# Patient Record
Sex: FEMALE | Race: BLACK OR AFRICAN AMERICAN | Employment: OTHER | URBAN - METROPOLITAN AREA
[De-identification: names, ages, dates, MRNs, and addresses within clinical notes are randomized per-mention and may not be internally consistent; named-entity substitution may affect disease eponyms.]

---

## 2017-09-13 ENCOUNTER — HOSPITAL ENCOUNTER (EMERGENCY)
Age: 72
Discharge: HOME | End: 2017-09-13
Attending: EMERGENCY MEDICINE
Payer: MEDICAID

## 2017-09-13 ENCOUNTER — APPOINTMENT (OUTPATIENT)
Dept: GENERAL RADIOLOGY | Age: 72
End: 2017-09-13
Attending: EMERGENCY MEDICINE
Payer: MEDICAID

## 2017-09-13 VITALS
TEMPERATURE: 98.5 F | SYSTOLIC BLOOD PRESSURE: 143 MMHG | HEART RATE: 98 BPM | DIASTOLIC BLOOD PRESSURE: 64 MMHG | BODY MASS INDEX: 19.3 KG/M2 | RESPIRATION RATE: 23 BRPM | WEIGHT: 123 LBS | OXYGEN SATURATION: 100 % | HEIGHT: 67 IN

## 2017-09-13 DIAGNOSIS — R07.89 CHEST WALL PAIN: Primary | ICD-10-CM

## 2017-09-13 DIAGNOSIS — F41.1 ANXIETY STATE: ICD-10-CM

## 2017-09-13 LAB
ALBUMIN SERPL-MCNC: 3.5 G/DL (ref 3.4–5)
ALBUMIN/GLOB SERPL: 0.6 {RATIO} (ref 0.8–1.7)
ALP SERPL-CCNC: 68 U/L (ref 45–117)
ALT SERPL-CCNC: 32 U/L (ref 13–56)
ANION GAP SERPL CALC-SCNC: 11 MMOL/L (ref 3–18)
AST SERPL-CCNC: 38 U/L (ref 15–37)
ATRIAL RATE: 98 BPM
BASOPHILS # BLD: 0 K/UL (ref 0–0.06)
BASOPHILS NFR BLD: 0 % (ref 0–2)
BILIRUB SERPL-MCNC: 0.4 MG/DL (ref 0.2–1)
BUN SERPL-MCNC: 20 MG/DL (ref 7–18)
BUN/CREAT SERPL: 22 (ref 12–20)
CALCIUM SERPL-MCNC: 9.7 MG/DL (ref 8.5–10.1)
CALCULATED P AXIS, ECG09: 66 DEGREES
CALCULATED R AXIS, ECG10: 9 DEGREES
CALCULATED T AXIS, ECG11: 73 DEGREES
CHLORIDE SERPL-SCNC: 102 MMOL/L (ref 100–108)
CK MB CFR SERPL CALC: 1.3 % (ref 0–4)
CK MB SERPL-MCNC: 1.9 NG/ML (ref 5–25)
CK SERPL-CCNC: 141 U/L (ref 26–192)
CO2 SERPL-SCNC: 23 MMOL/L (ref 21–32)
CREAT SERPL-MCNC: 0.89 MG/DL (ref 0.6–1.3)
DIAGNOSIS, 93000: NORMAL
DIFFERENTIAL METHOD BLD: ABNORMAL
EOSINOPHIL # BLD: 0.4 K/UL (ref 0–0.4)
EOSINOPHIL NFR BLD: 7 % (ref 0–5)
ERYTHROCYTE [DISTWIDTH] IN BLOOD BY AUTOMATED COUNT: 14.2 % (ref 11.6–14.5)
GLOBULIN SER CALC-MCNC: 6.1 G/DL (ref 2–4)
GLUCOSE SERPL-MCNC: 108 MG/DL (ref 74–99)
HCT VFR BLD AUTO: 40.1 % (ref 35–45)
HGB BLD-MCNC: 13.3 G/DL (ref 12–16)
LYMPHOCYTES # BLD: 1.7 K/UL (ref 0.9–3.6)
LYMPHOCYTES NFR BLD: 33 % (ref 21–52)
MCH RBC QN AUTO: 28.4 PG (ref 24–34)
MCHC RBC AUTO-ENTMCNC: 33.2 G/DL (ref 31–37)
MCV RBC AUTO: 85.5 FL (ref 74–97)
MONOCYTES # BLD: 0.4 K/UL (ref 0.05–1.2)
MONOCYTES NFR BLD: 8 % (ref 3–10)
NEUTS SEG # BLD: 2.7 K/UL (ref 1.8–8)
NEUTS SEG NFR BLD: 52 % (ref 40–73)
P-R INTERVAL, ECG05: 162 MS
PLATELET # BLD AUTO: 224 K/UL (ref 135–420)
PMV BLD AUTO: 10.8 FL (ref 9.2–11.8)
POTASSIUM SERPL-SCNC: 3.8 MMOL/L (ref 3.5–5.5)
PROT SERPL-MCNC: 9.6 G/DL (ref 6.4–8.2)
Q-T INTERVAL, ECG07: 372 MS
QRS DURATION, ECG06: 86 MS
QTC CALCULATION (BEZET), ECG08: 474 MS
RBC # BLD AUTO: 4.69 M/UL (ref 4.2–5.3)
SODIUM SERPL-SCNC: 136 MMOL/L (ref 136–145)
TROPONIN I SERPL-MCNC: <0.02 NG/ML (ref 0–0.04)
VENTRICULAR RATE, ECG03: 98 BPM
WBC # BLD AUTO: 5.1 K/UL (ref 4.6–13.2)

## 2017-09-13 PROCEDURE — 71010 XR CHEST PORT: CPT

## 2017-09-13 PROCEDURE — 85025 COMPLETE CBC W/AUTO DIFF WBC: CPT | Performed by: EMERGENCY MEDICINE

## 2017-09-13 PROCEDURE — 74011250637 HC RX REV CODE- 250/637: Performed by: EMERGENCY MEDICINE

## 2017-09-13 PROCEDURE — 82550 ASSAY OF CK (CPK): CPT | Performed by: EMERGENCY MEDICINE

## 2017-09-13 PROCEDURE — 99284 EMERGENCY DEPT VISIT MOD MDM: CPT

## 2017-09-13 PROCEDURE — 74011250636 HC RX REV CODE- 250/636: Performed by: EMERGENCY MEDICINE

## 2017-09-13 PROCEDURE — 80053 COMPREHEN METABOLIC PANEL: CPT | Performed by: EMERGENCY MEDICINE

## 2017-09-13 PROCEDURE — 93005 ELECTROCARDIOGRAM TRACING: CPT

## 2017-09-13 PROCEDURE — 96374 THER/PROPH/DIAG INJ IV PUSH: CPT

## 2017-09-13 RX ORDER — IBUPROFEN 400 MG/1
400 TABLET ORAL
Qty: 20 TAB | Refills: 0 | Status: SHIPPED | OUTPATIENT
Start: 2017-09-13 | End: 2017-11-18

## 2017-09-13 RX ORDER — GUAIFENESIN 100 MG/5ML
162 LIQUID (ML) ORAL
Status: COMPLETED | OUTPATIENT
Start: 2017-09-13 | End: 2017-09-13

## 2017-09-13 RX ORDER — LORAZEPAM 2 MG/ML
0.5 INJECTION INTRAMUSCULAR
Status: COMPLETED | OUTPATIENT
Start: 2017-09-13 | End: 2017-09-13

## 2017-09-13 RX ORDER — ACETAMINOPHEN 500 MG
500 TABLET ORAL
Status: COMPLETED | OUTPATIENT
Start: 2017-09-13 | End: 2017-09-13

## 2017-09-13 RX ORDER — ACETAMINOPHEN 500 MG
1000 TABLET ORAL
Qty: 40 TAB | Refills: 0 | Status: SHIPPED | OUTPATIENT
Start: 2017-09-13 | End: 2018-08-03

## 2017-09-13 RX ADMIN — ASPIRIN 81 MG 162 MG: 81 TABLET ORAL at 04:53

## 2017-09-13 RX ADMIN — LORAZEPAM 0.5 MG: 2 INJECTION INTRAMUSCULAR; INTRAVENOUS at 04:56

## 2017-09-13 RX ADMIN — ACETAMINOPHEN 500 MG: 500 TABLET ORAL at 04:54

## 2017-09-13 NOTE — ED PROVIDER NOTES
HPI Comments: Damir Torres is a 71 y.o. Female with h/o htn, hiv with c/o constant right sided cp, bilateral leg pain for last 2 days with no sob, exertional pain, fcs. Some non prod cough. No nvd, abd pain, swelling. Sx worse with movement, turning. Nothing taken. States she has been compliant with her meds. No h/o cad. Seen prior for chest pain, similar with neg cta chest, serial enzymes. No stress testing done in last 3 years. Nothing taken for pain. Transported via ems and was noted to have very elevated blood pressure with no prehosp ecg, nor interventions performed    The history is provided by the patient. Past Medical History:   Diagnosis Date    AIDS ([facility] [address]     Asthma     Hypertension        History reviewed. No pertinent surgical history. History reviewed. No pertinent family history. Social History     Social History    Marital status: LEGALLY      Spouse name: N/A    Number of children: N/A    Years of education: N/A     Occupational History    Not on file. Social History Main Topics    Smoking status: Never Smoker    Smokeless tobacco: Never Used    Alcohol use No    Drug use: No    Sexual activity: Not on file     Other Topics Concern    Not on file     Social History Narrative         ALLERGIES: Pcn [penicillins]    Review of Systems   Constitutional: Negative for fever. HENT: Negative for sore throat. Eyes: Negative for visual disturbance. Respiratory: Positive for cough. Negative for shortness of breath. Cardiovascular: Positive for chest pain. Negative for leg swelling. Gastrointestinal: Negative for abdominal pain and blood in stool. Endocrine: Negative for polyuria. Genitourinary: Negative for difficulty urinating. Musculoskeletal: Positive for myalgias. Negative for gait problem. Skin: Negative for rash. Allergic/Immunologic: Negative for immunocompromised state (cd4 recently over 400). Neurological: Negative for syncope. Psychiatric/Behavioral: Positive for sleep disturbance. Vitals:    09/13/17 0438 09/13/17 0450   BP:  143/64   Pulse: 98    Resp: 23    Temp: 98.5 °F (36.9 °C)    SpO2: 100%    Weight: 55.8 kg (123 lb)    Height: 5' 7\" (1.702 m)             Physical Exam   Constitutional: She is oriented to person, place, and time. She appears well-developed and well-nourished. She appears distressed (appears very anxious). HENT:   Head: Normocephalic and atraumatic. Right Ear: External ear normal.   Left Ear: External ear normal.   Nose: Nose normal.   Mouth/Throat: Uvula is midline, oropharynx is clear and moist and mucous membranes are normal.   Eyes: Conjunctivae are normal. No scleral icterus. Neck: Neck supple. Cardiovascular: Normal rate, regular rhythm, normal heart sounds and intact distal pulses. Pulses:       Radial pulses are 2+ on the right side, and 2+ on the left side. Femoral pulses are 2+ on the right side, and 2+ on the left side. Dorsalis pedis pulses are 2+ on the right side, and 2+ on the left side. Posterior tibial pulses are 2+ on the right side, and 2+ on the left side. Pulmonary/Chest: Effort normal and breath sounds normal. She exhibits tenderness and bony tenderness. She exhibits no edema, no deformity and no swelling. Abdominal: Soft. There is no tenderness. Musculoskeletal: She exhibits no edema. Neurological: She is alert and oriented to person, place, and time. Gait normal.   Skin: Skin is warm and dry. She is not diaphoretic. Psychiatric: Her behavior is normal.   Nursing note and vitals reviewed.        [facility]  ED Course       Procedures    Vitals:  Patient Vitals for the past 12 hrs:   Temp Pulse Resp BP SpO2   09/13/17 0450 - - - 143/64 -   09/13/17 0438 98.5 °F (36.9 °C) 98 23 - 100 %         Medications ordered:   Medications   LORazepam (ATIVAN) injection 0.5 mg (0.5 mg IntraVENous Given 9/13/17 0456)   aspirin chewable tablet 162 mg (162 mg Oral Given 9/13/17 0453)   acetaminophen (TYLENOL) tablet 500 mg (500 mg Oral Given 9/13/17 0454)         Lab findings:  Recent Results (from the past 12 hour(s))   EKG, 12 LEAD, INITIAL    Collection Time: 09/13/17  4:49 AM   Result Value Ref Range    Ventricular Rate 98 BPM    Atrial Rate 98 BPM    P-R Interval 162 ms    QRS Duration 86 ms    Q-T Interval 372 ms    QTC Calculation (Bezet) 474 ms    Calculated P Axis 66 degrees    Calculated R Axis 9 degrees    Calculated T Axis 73 degrees    Diagnosis       Sinus rhythm with premature atrial complexes  Possible Left atrial enlargement  Left ventricular hypertrophy  Prolonged QT  Abnormal ECG  When compared with ECG of 03-FEB-2015 21:35,  premature atrial complexes are now present     CBC WITH AUTOMATED DIFF    Collection Time: 09/13/17  4:55 AM   Result Value Ref Range    WBC 5.1 4.6 - 13.2 K/uL    RBC 4.69 4.20 - 5.30 M/uL    HGB 13.3 12.0 - 16.0 g/dL    HCT 40.1 35.0 - 45.0 %    MCV 85.5 74.0 - 97.0 FL    MCH 28.4 24.0 - 34.0 PG    MCHC 33.2 31.0 - 37.0 g/dL    RDW 14.2 11.6 - 14.5 %    PLATELET 323 176 - 637 K/uL    MPV 10.8 9.2 - 11.8 FL    NEUTROPHILS 52 40 - 73 %    LYMPHOCYTES 33 21 - 52 %    MONOCYTES 8 3 - 10 %    EOSINOPHILS 7 (H) 0 - 5 %    BASOPHILS 0 0 - 2 %    ABS. NEUTROPHILS 2.7 1.8 - 8.0 K/UL    ABS. LYMPHOCYTES 1.7 0.9 - 3.6 K/UL    ABS. MONOCYTES 0.4 0.05 - 1.2 K/UL    ABS. EOSINOPHILS 0.4 0.0 - 0.4 K/UL    ABS.  BASOPHILS 0.0 0.0 - 0.06 K/UL    DF AUTOMATED     METABOLIC PANEL, COMPREHENSIVE    Collection Time: 09/13/17  4:55 AM   Result Value Ref Range    Sodium 136 136 - 145 mmol/L    Potassium 3.8 3.5 - 5.5 mmol/L    Chloride 102 100 - 108 mmol/L    CO2 23 21 - 32 mmol/L    Anion gap 11 3.0 - 18 mmol/L    Glucose 108 (H) 74 - 99 mg/dL    BUN 20 (H) 7.0 - 18 MG/DL    Creatinine 0.89 0.6 - 1.3 MG/DL    BUN/Creatinine ratio 22 (H) 12 - 20      GFR est AA >60 >60 ml/min/1.73m2    GFR est non-AA >60 >60 ml/min/1.73m2    Calcium 9.7 8.5 - 10.1 MG/DL Bilirubin, total 0.4 0.2 - 1.0 MG/DL    ALT (SGPT) 32 13 - 56 U/L    AST (SGOT) 38 (H) 15 - 37 U/L    Alk. phosphatase 68 45 - 117 U/L    Protein, total 9.6 (H) 6.4 - 8.2 g/dL    Albumin 3.5 3.4 - 5.0 g/dL    Globulin 6.1 (H) 2.0 - 4.0 g/dL    A-G Ratio 0.6 (L) 0.8 - 1.7     CARDIAC PANEL,(CK, CKMB & TROPONIN)    Collection Time: 09/13/17  4:55 AM   Result Value Ref Range     26 - 192 U/L    CK - MB 1.9 <3.6 ng/ml    CK-MB Index 1.3 0.0 - 4.0 %    Troponin-I, Qt. <0.02 0.0 - 0.045 NG/ML       EKG interpretation by ED Physician:  nsr with no acute st tw changes  lvh  Rate 98, qtc 474  There are no pac's noted per my interp. Misread by computer    No sig changes from previous      Cardiac monitor: nl rate, regular rhythm, no ectopy      X-Ray, CT or other radiology findings or impressions:  XR CHEST PORT    (Results Pending)     Nap ep interp  Progress notes, Consult notes or additional Procedure notes:   Pain resolved. Constant pain for over 24  Hours, neg trop. Doubt acute mi. Doubt dissection, pe, need for serial testing. I have discussed with patient and/or family/sig other the results, interpretation of any imaging if performed, suspected diagnosis and treatment plan to include instructions regarding the diagnoses listed to which understanding was expressed with all questions answered      Reevaluation of patient:   Stable for dc    Disposition:  Diagnosis:   1. Chest wall pain    2.  Anxiety state        Disposition: home      Follow-up Information     Follow up With Details Comments Contact Info    Moshe Acevedo MD Schedule an appointment as soon as possible for a visit this week for recheck [address] [address] [facility] EMERGENCY DEPT  If symptoms worsen [address]  [phone]            Patient's Medications   Start Taking    ACETAMINOPHEN (TYLENOL EXTRA STRENGTH) 500 MG TABLET    Take 2 Tabs by mouth every six (6) hours as needed for Pain. IBUPROFEN (MOTRIN) 400 MG TABLET    Take 1 Tab by mouth every six (6) hours as needed for Pain. Continue Taking    ABACAVIR-LAMIVUDINE (EPZICOM) 600-300 MG TABLET    Take 1 Tab by mouth daily. DARUNAVIR (PREZISTA) 600 MG TABLET    Take 600 mg by mouth two (2) times a day. GABAPENTIN (NEURONTIN) 100 MG CAPSULE    Take 1 Cap by mouth three (3) times daily. MIRTAZAPINE (REMERON) 45 MG TABLET    Take 45 mg by mouth nightly. RITONAVIR (NORVIR) 100 MG TABLET    Take 100 mg by mouth two (2) times daily (with meals). These Medications have changed    No medications on file   Stop Taking    GUAIFENESIN-CODEINE (CHERATUSSIN AC)  MG/5 ML SOLUTION    Take 10 mL by mouth three (3) times daily as needed for Cough.

## 2017-09-13 NOTE — ED NOTES
I have reviewed discharge instructions with the patient. The patient verbalized understanding. Medicaid cab called for patient. Patient taken to waiting room in wheelchair to await medicaid cab.      Patient armband removed and shredded

## 2017-11-15 ENCOUNTER — HOME CARE VISIT (OUTPATIENT)
Dept: HOME HEALTH SERVICES | Facility: HOME HEALTH | Age: 72
End: 2017-11-15

## 2017-11-16 ENCOUNTER — APPOINTMENT (OUTPATIENT)
Dept: MRI IMAGING | Age: 72
DRG: 892 | End: 2017-11-16
Attending: HOSPITALIST
Payer: MEDICAID

## 2017-11-16 ENCOUNTER — HOSPITAL ENCOUNTER (INPATIENT)
Age: 72
LOS: 2 days | Discharge: HOME HEALTH | DRG: 892 | End: 2017-11-18
Attending: EMERGENCY MEDICINE | Admitting: HOSPITALIST
Payer: MEDICAID

## 2017-11-16 ENCOUNTER — APPOINTMENT (OUTPATIENT)
Dept: CT IMAGING | Age: 72
DRG: 892 | End: 2017-11-16
Attending: EMERGENCY MEDICINE
Payer: MEDICAID

## 2017-11-16 DIAGNOSIS — N17.9 ACUTE KIDNEY INJURY (HCC): ICD-10-CM

## 2017-11-16 DIAGNOSIS — R20.2 NUMBNESS AND TINGLING OF BOTH LEGS: Primary | ICD-10-CM

## 2017-11-16 DIAGNOSIS — N28.9 RENAL INSUFFICIENCY: ICD-10-CM

## 2017-11-16 DIAGNOSIS — B20 HISTORY OF HIV INFECTION (HCC): ICD-10-CM

## 2017-11-16 DIAGNOSIS — R20.0 NUMBNESS AND TINGLING OF BOTH LEGS: Primary | ICD-10-CM

## 2017-11-16 DIAGNOSIS — R74.8 ELEVATED CK: ICD-10-CM

## 2017-11-16 DIAGNOSIS — R29.6 FREQUENT FALLS: ICD-10-CM

## 2017-11-16 DIAGNOSIS — R41.82 ALTERED MENTAL STATUS, UNSPECIFIED ALTERED MENTAL STATUS TYPE: ICD-10-CM

## 2017-11-16 PROBLEM — R53.81 PHYSICAL DECONDITIONING: Status: ACTIVE | Noted: 2017-11-16

## 2017-11-16 LAB
ALBUMIN SERPL-MCNC: 3.7 G/DL (ref 3.4–5)
ALBUMIN/GLOB SERPL: 0.6 {RATIO} (ref 0.8–1.7)
ALP SERPL-CCNC: 59 U/L (ref 45–117)
ALT SERPL-CCNC: 28 U/L (ref 13–56)
ANION GAP SERPL CALC-SCNC: 7 MMOL/L (ref 3–18)
APPEARANCE UR: CLEAR
AST SERPL-CCNC: 33 U/L (ref 15–37)
BACTERIA URNS QL MICRO: NEGATIVE /HPF
BASOPHILS # BLD: 0 K/UL (ref 0–0.06)
BASOPHILS NFR BLD: 0 % (ref 0–2)
BILIRUB SERPL-MCNC: 0.9 MG/DL (ref 0.2–1)
BILIRUB UR QL: NEGATIVE
BUN SERPL-MCNC: 39 MG/DL (ref 7–18)
BUN/CREAT SERPL: 24 (ref 12–20)
CALCIUM SERPL-MCNC: 8.9 MG/DL (ref 8.5–10.1)
CHLORIDE SERPL-SCNC: 101 MMOL/L (ref 100–108)
CK MB CFR SERPL CALC: 1.3 % (ref 0–4)
CK MB SERPL-MCNC: 3.9 NG/ML (ref 5–25)
CK SERPL-CCNC: 294 U/L (ref 26–192)
CO2 SERPL-SCNC: 28 MMOL/L (ref 21–32)
COLOR UR: YELLOW
CREAT SERPL-MCNC: 1.62 MG/DL (ref 0.6–1.3)
DIFFERENTIAL METHOD BLD: NORMAL
EOSINOPHIL # BLD: 0.3 K/UL (ref 0–0.4)
EOSINOPHIL NFR BLD: 4 % (ref 0–5)
EPITH CASTS URNS QL MICRO: NORMAL /LPF (ref 0–5)
ERYTHROCYTE [DISTWIDTH] IN BLOOD BY AUTOMATED COUNT: 14.1 % (ref 11.6–14.5)
GLOBULIN SER CALC-MCNC: 6.1 G/DL (ref 2–4)
GLUCOSE SERPL-MCNC: 108 MG/DL (ref 74–99)
GLUCOSE UR STRIP.AUTO-MCNC: NEGATIVE MG/DL
HCT VFR BLD AUTO: 39.1 % (ref 35–45)
HGB BLD-MCNC: 13.1 G/DL (ref 12–16)
HGB UR QL STRIP: NEGATIVE
KETONES UR QL STRIP.AUTO: NEGATIVE MG/DL
LEUKOCYTE ESTERASE UR QL STRIP.AUTO: ABNORMAL
LYMPHOCYTES # BLD: 2.1 K/UL (ref 0.9–3.6)
LYMPHOCYTES NFR BLD: 32 % (ref 21–52)
MCH RBC QN AUTO: 29.2 PG (ref 24–34)
MCHC RBC AUTO-ENTMCNC: 33.5 G/DL (ref 31–37)
MCV RBC AUTO: 87.1 FL (ref 74–97)
MONOCYTES # BLD: 0.4 K/UL (ref 0.05–1.2)
MONOCYTES NFR BLD: 7 % (ref 3–10)
NEUTS SEG # BLD: 3.7 K/UL (ref 1.8–8)
NEUTS SEG NFR BLD: 57 % (ref 40–73)
NITRITE UR QL STRIP.AUTO: NEGATIVE
PH UR STRIP: 5 [PH] (ref 5–8)
PLATELET # BLD AUTO: 219 K/UL (ref 135–420)
PMV BLD AUTO: 11.1 FL (ref 9.2–11.8)
POTASSIUM SERPL-SCNC: 3.9 MMOL/L (ref 3.5–5.5)
PROT SERPL-MCNC: 9.8 G/DL (ref 6.4–8.2)
PROT UR STRIP-MCNC: NEGATIVE MG/DL
RBC # BLD AUTO: 4.49 M/UL (ref 4.2–5.3)
RBC #/AREA URNS HPF: 0 /HPF (ref 0–5)
RPR SER QL: NONREACTIVE
SODIUM SERPL-SCNC: 136 MMOL/L (ref 136–145)
SP GR UR REFRACTOMETRY: 1.02 (ref 1–1.03)
TROPONIN I SERPL-MCNC: <0.02 NG/ML (ref 0–0.04)
TSH SERPL DL<=0.05 MIU/L-ACNC: 0.66 UIU/ML (ref 0.36–3.74)
UROBILINOGEN UR QL STRIP.AUTO: 2 EU/DL (ref 0.2–1)
WBC # BLD AUTO: 6.4 K/UL (ref 4.6–13.2)
WBC URNS QL MICRO: NORMAL /HPF (ref 0–4)

## 2017-11-16 PROCEDURE — 94761 N-INVAS EAR/PLS OXIMETRY MLT: CPT

## 2017-11-16 PROCEDURE — 74011250636 HC RX REV CODE- 250/636: Performed by: HOSPITALIST

## 2017-11-16 PROCEDURE — 74011000258 HC RX REV CODE- 258: Performed by: HOSPITALIST

## 2017-11-16 PROCEDURE — 70450 CT HEAD/BRAIN W/O DYE: CPT

## 2017-11-16 PROCEDURE — 86592 SYPHILIS TEST NON-TREP QUAL: CPT | Performed by: HOSPITALIST

## 2017-11-16 PROCEDURE — 86361 T CELL ABSOLUTE COUNT: CPT | Performed by: HOSPITALIST

## 2017-11-16 PROCEDURE — 70551 MRI BRAIN STEM W/O DYE: CPT

## 2017-11-16 PROCEDURE — 82550 ASSAY OF CK (CPK): CPT | Performed by: EMERGENCY MEDICINE

## 2017-11-16 PROCEDURE — 81001 URINALYSIS AUTO W/SCOPE: CPT | Performed by: EMERGENCY MEDICINE

## 2017-11-16 PROCEDURE — 84443 ASSAY THYROID STIM HORMONE: CPT | Performed by: HOSPITALIST

## 2017-11-16 PROCEDURE — 99285 EMERGENCY DEPT VISIT HI MDM: CPT

## 2017-11-16 PROCEDURE — 87186 SC STD MICRODIL/AGAR DIL: CPT | Performed by: HOSPITALIST

## 2017-11-16 PROCEDURE — 74011250637 HC RX REV CODE- 250/637: Performed by: HOSPITALIST

## 2017-11-16 PROCEDURE — 87086 URINE CULTURE/COLONY COUNT: CPT | Performed by: HOSPITALIST

## 2017-11-16 PROCEDURE — 87536 HIV-1 QUANT&REVRSE TRNSCRPJ: CPT | Performed by: HOSPITALIST

## 2017-11-16 PROCEDURE — 96360 HYDRATION IV INFUSION INIT: CPT

## 2017-11-16 PROCEDURE — 74011250636 HC RX REV CODE- 250/636: Performed by: EMERGENCY MEDICINE

## 2017-11-16 PROCEDURE — 87077 CULTURE AEROBIC IDENTIFY: CPT | Performed by: HOSPITALIST

## 2017-11-16 PROCEDURE — 93005 ELECTROCARDIOGRAM TRACING: CPT

## 2017-11-16 PROCEDURE — 80053 COMPREHEN METABOLIC PANEL: CPT | Performed by: EMERGENCY MEDICINE

## 2017-11-16 PROCEDURE — 65270000029 HC RM PRIVATE

## 2017-11-16 PROCEDURE — 74011250637 HC RX REV CODE- 250/637: Performed by: PHYSICIAN ASSISTANT

## 2017-11-16 PROCEDURE — 85025 COMPLETE CBC W/AUTO DIFF WBC: CPT | Performed by: EMERGENCY MEDICINE

## 2017-11-16 PROCEDURE — 96361 HYDRATE IV INFUSION ADD-ON: CPT

## 2017-11-16 RX ORDER — HEPARIN SODIUM 5000 [USP'U]/ML
5000 INJECTION, SOLUTION INTRAVENOUS; SUBCUTANEOUS EVERY 8 HOURS
Status: DISCONTINUED | OUTPATIENT
Start: 2017-11-16 | End: 2017-11-18 | Stop reason: HOSPADM

## 2017-11-16 RX ORDER — DIPHENHYDRAMINE HYDROCHLORIDE 50 MG/ML
25 INJECTION, SOLUTION INTRAMUSCULAR; INTRAVENOUS
Status: COMPLETED | OUTPATIENT
Start: 2017-11-16 | End: 2017-11-16

## 2017-11-16 RX ORDER — AMLODIPINE BESYLATE 5 MG/1
10 TABLET ORAL DAILY
Status: DISCONTINUED | OUTPATIENT
Start: 2017-11-16 | End: 2017-11-18 | Stop reason: HOSPADM

## 2017-11-16 RX ORDER — LEVOFLOXACIN 5 MG/ML
250 INJECTION, SOLUTION INTRAVENOUS EVERY 24 HOURS
Status: DISCONTINUED | OUTPATIENT
Start: 2017-11-17 | End: 2017-11-18 | Stop reason: HOSPADM

## 2017-11-16 RX ORDER — GUAIFENESIN 100 MG/5ML
234 LIQUID (ML) ORAL
Status: COMPLETED | OUTPATIENT
Start: 2017-11-16 | End: 2017-11-16

## 2017-11-16 RX ORDER — DEXTROSE MONOHYDRATE AND SODIUM CHLORIDE 5; .45 G/100ML; G/100ML
100 INJECTION, SOLUTION INTRAVENOUS CONTINUOUS
Status: DISCONTINUED | OUTPATIENT
Start: 2017-11-16 | End: 2017-11-18

## 2017-11-16 RX ORDER — LEVOFLOXACIN 5 MG/ML
500 INJECTION, SOLUTION INTRAVENOUS ONCE
Status: COMPLETED | OUTPATIENT
Start: 2017-11-16 | End: 2017-11-16

## 2017-11-16 RX ORDER — GUAIFENESIN 600 MG/1
600 TABLET, EXTENDED RELEASE ORAL EVERY 12 HOURS
Status: DISCONTINUED | OUTPATIENT
Start: 2017-11-16 | End: 2017-11-18 | Stop reason: HOSPADM

## 2017-11-16 RX ADMIN — AMLODIPINE BESYLATE 10 MG: 5 TABLET ORAL at 22:58

## 2017-11-16 RX ADMIN — SODIUM CHLORIDE 500 ML: 900 INJECTION, SOLUTION INTRAVENOUS at 15:53

## 2017-11-16 RX ADMIN — SODIUM CHLORIDE 1000 ML: 900 INJECTION, SOLUTION INTRAVENOUS at 19:38

## 2017-11-16 RX ADMIN — LEVOFLOXACIN 500 MG: 500 INJECTION, SOLUTION INTRAVENOUS at 22:58

## 2017-11-16 RX ADMIN — ASPIRIN 81 MG 243 MG: 81 TABLET ORAL at 20:42

## 2017-11-16 RX ADMIN — GUAIFENESIN 600 MG: 600 TABLET, EXTENDED RELEASE ORAL at 22:58

## 2017-11-16 RX ADMIN — DIPHENHYDRAMINE HYDROCHLORIDE 25 MG: 50 INJECTION, SOLUTION INTRAMUSCULAR; INTRAVENOUS at 23:36

## 2017-11-16 NOTE — IP AVS SNAPSHOT
[address] [facility], [place] 
 
 
 [address] 
[phone] Patient: Porsche King MRN: [id number] :1945 About your hospitalization You were admitted on:  2017 You last received care in the:  [facility] 3 INTRVNTNL CARDIO You were discharged on:  2017 Why you were hospitalized Your primary diagnosis was:  Not on File Your diagnoses also included:  Physical Deconditioning, Irvin (Acute Kidney Injury) (Hcc) Things You Need To Do (next 8 weeks) Call Rohini Jefferson MD today FOR FOLLOW UP APPOINTMENT. Phone:  [phone] Where:  [address] [facility], [address] [facility] [address], [address] [facility] [address] Call Shaye Allen MD today IN THE MORNING FOR A FOLLOW UP EVALUATION. Phone:  [phone] Where:  [address] [facility], [place] [phone] Discharge Orders None A check gisselle indicates which time of day the medication should be taken. My Medications STOP taking these medications   
 ibuprofen 400 mg tablet Commonly known as:  MOTRIN  
   
  
  
TAKE these medications as instructed Instructions Each Dose to Equal  
 Morning Noon Evening Bedtime  
 acetaminophen 500 mg tablet Commonly known as:  [address] [facility] Your last dose was: Your next dose is: Take 2 Tabs by mouth every six (6) hours as needed for Pain. 1000 mg  
    
   
   
   
  
 amLODIPine 10 mg tablet Commonly known as:  [place] Start taking on:  2017 Your last dose was: Your next dose is: Take 1 Tab by mouth daily. 10 mg  
    
   
   
   
  
 aspirin 325 mg tablet Commonly known as:  ASPIRIN Start taking on:  2017 Your last dose was: Your next dose is: Take 1 Tab by mouth daily. 325 mg  
    
   
   
   
  
 atorvastatin 40 mg tablet Commonly known as:  LIPITOR Your last dose was: Your next dose is: Take 1 Tab by mouth nightly. 40 mg  
    
   
   
   
  
 EPZICOM 600-300 mg tablet Generic drug:  abacavir-lamiVUDine Your last dose was: Your next dose is: Take 1 Tab by mouth daily. 1 Tab  
    
   
   
   
  
 gabapentin 100 mg capsule Commonly known as:  NEURONTIN Your last dose was: Your next dose is: Take 1 Cap by mouth three (3) times daily. 100 mg  
    
   
   
   
  
 mirtazapine 45 mg tablet Commonly known as:  Jessica Jj Your last dose was: Your next dose is: Take 45 mg by mouth nightly. 45 mg  
    
   
   
   
  
 NORVIR 100 mg tablet Generic drug:  ritonavir Your last dose was: Your next dose is: Take 100 mg by mouth two (2) times daily (with meals). 100 mg PREZISTA 600 mg tablet Generic drug:  darunavir Your last dose was: Your next dose is: Take 600 mg by mouth two (2) times a day. 600 mg  
    
   
   
   
  
 trimethoprim-sulfamethoxazole 160-800 mg per tablet Commonly known as:  BACTRIM DS Your last dose was: Your next dose is: Take 1 Tab by mouth two (2) times a day. 1 Tab Where to Get Your Medications Information on where to get these meds will be given to you by the nurse or doctor. ! Ask your nurse or doctor about these medications  
  amLODIPine 10 mg tablet  
 aspirin 325 mg tablet  
 atorvastatin 40 mg tablet  
 trimethoprim-sulfamethoxazole 160-800 mg per tablet Discharge Instructions Patient armband removed and shredded DISCHARGE SUMMARY from Nurse PATIENT INSTRUCTIONS: 
 
 
F-face looks uneven A-arms unable to move or move unevenly S-speech slurred or non-existent T-time-call 911 as soon as signs and symptoms begin-DO NOT go Back to bed or wait to see if you get better-TIME IS BRAIN. Warning Signs of HEART ATTACK Call 911 if you have these symptoms: 
? Chest discomfort. Most heart attacks involve discomfort in the center of the chest that lasts more than a few minutes, or that goes away and comes back. It can feel like uncomfortable pressure, squeezing, fullness, or pain. ? Discomfort in other areas of the upper body. Symptoms can include pain or discomfort in one or both arms, the back, neck, jaw, or stomach. ? Shortness of breath with or without chest discomfort. ? Other signs may include breaking out in a cold sweat, nausea, or lightheadedness. Don't wait more than five minutes to call [address]! Fast action can save your life. Calling 911 is almost always the fastest way to get lifesaving treatment. Emergency Medical Services staff can begin treatment when they arrive  up to an hour sooner than if someone gets to the hospital by car. The discharge information has been reviewed with the patient. The patient verbalized understanding. Discharge medications reviewed with the patient and appropriate educational materials and side effects teaching were provided. ___________________________________________________________________________________________________________________________________ Numbness and Tingling: Care Instructions Your Care Instructions Many things can cause numbness or tingling. Swelling may put pressure on a nerve. This could cause you to lose feeling or have a pins-and-needles sensation on part of your body. Nerves may be damaged from trauma, toxins, or diseases, such as diabetes or multiple sclerosis (MS). Sometimes, though, the cause is not clear. If there is no clear reason for your symptoms, and you are not having any other symptoms, your doctor may suggest watching and waiting for a while to see if the numbness or tingling goes away on its own. Your doctor may want you to have blood or nerve tests to find the cause of your symptoms. Follow-up care is a key part of your treatment and safety. Be sure to make and go to all appointments, and call your doctor if you are having problems. It's also a good idea to know your test results and keep a list of the medicines you take. How can you care for yourself at home? · If your doctor prescribes medicine, take it exactly as directed. Call your doctor if you think you are having a problem with your medicine. · If you have any swelling, put ice or a cold pack on the area for 10 to 20 minutes at a time. Put a thin cloth between the ice and your skin. When should you call for help? Call 911 anytime you think you may need emergency care. For example, call if: 
? · You have weakness, numbness, or tingling in both legs. ? · You lose bowel or bladder control. ? · You have symptoms of a stroke. These may include: 
¨ Sudden numbness, tingling, weakness, or loss of movement in your face, arm, or leg, especially on only one side of your body. ¨ Sudden vision changes. ¨ Sudden trouble speaking. ¨ Sudden confusion or trouble understanding simple statements. ¨ Sudden problems with walking or balance. ¨ A sudden, severe headache that is different from past headaches. ? Watch closely for changes in your health, and be sure to contact your doctor if you have any problems, or if: 
? · You do not get better as expected. Where can you learn more? Go to http://amari-noy.info/. Enter O911 in the search box to learn more about \"Numbness and Tingling: Care Instructions. \" Current as of: October 14, 2016 Content Version: 11.4 © 0843-8930 Healthwise, Incorporated.  Care instructions adapted under license by [address] (which disclaims liability or warranty for this information). If you have questions about a medical condition or this instruction, always ask your healthcare professional. [address] any warranty or liability for your use of this information. [employer] Activation Thank you for requesting access to [employer]. Please follow the instructions below to securely access and download your online medical record. [employer] allows you to send messages to your doctor, view your test results, renew your prescriptions, schedule appointments, and more. How Do I Sign Up? 1. In your internet browser, go to [email or web address] 
2. Click on the First Time User? Click Here link in the Sign In box. You will be redirect to the New Member Sign Up page. 3. Enter your [employer] Access Code exactly as it appears below. You will not need to use this code after youve completed the sign-up process. If you do not sign up before the expiration date, you must request a new code. [employer] Access Code: [email or web address] Expires: 2017  4:34 AM (This is the date your [employer] access code will ) 4. Enter the last four digits of your Social Security Number (xxxx) and Date of Birth (mm/dd/yyyy) as indicated and click Submit. You will be taken to the next sign-up page. 5. Create a Mobakids ID. This will be your [employer] login ID and cannot be changed, so think of one that is secure and easy to remember. 6. Create a [employer] password. You can change your password at any time. 7. Enter your Password Reset Question and Answer. This can be used at a later time if you forget your password. 8. Enter your e-mail address. You will receive e-mail notification when new information is available in [address] 9. Click Sign Up. You can now view and download portions of your medical record.  
10. Click the Download Summary menu link to download a portable copy of your medical information. Additional Information If you have questions, please visit the Frequently Asked Questions section of the [employer] website at [email or web address] Remember, [employer] is NOT to be used for urgent needs. For medical emergencies, dial 911. FOLLOW UP WITH DR PERALTA BY CALLING IN THE MORNING FOR AN APPOINTMENT. IF YOUR SYMPTOMS WORSE, RETURN TO THE ER AT ONCE FOR RE-EVALUATION. Introducing [facility]! Nidia Yañez introduces [employer] patient portal. Now you can access parts of your medical record, email your doctor's office, and request medication refills online. 1. In your internet browser, go to [email or web address] 2. Click on the First Time User? Click Here link in the Sign In box. You will see the New Member Sign Up page. 3. Enter your [employer] Access Code exactly as it appears below. You will not need to use this code after youve completed the sign-up process. If you do not sign up before the expiration date, you must request a new code. · [employer] Access Code: [email or web address] Expires: 12/12/2017  4:34 AM 
 
4. Enter the last four digits of your Social Security Number (xxxx) and Date of Birth (mm/dd/yyyy) as indicated and click Submit. You will be taken to the next sign-up page. 5. Create a [employer] ID. This will be your [employer] login ID and cannot be changed, so think of one that is secure and easy to remember. 6. Create a [employer] password. You can change your password at any time. 7. Enter your Password Reset Question and Answer. This can be used at a later time if you forget your password. 8. Enter your e-mail address. You will receive e-mail notification when new information is available in [address] 9. Click Sign Up. You can now view and download portions of your medical record. 10. Click the Download Summary menu link to download a portable copy of your medical information. If you have questions, please visit the Frequently Asked Questions section of the MyChart website. Remember, MyChart is NOT to be used for urgent needs. For medical emergencies, dial 911. Now available from your iPhone and Android! Unresulted Labs-Please follow up with your PCP about these lab tests Order Current Status HIV-1 RNA QT BY PCR In process CULTURE, URINE Preliminary result Providers Seen During Your Hospitalization Provider Specialty Primary office phone Miguel Angel Oswald MD Emergency Medicine [phone] Devyn Saul, DO Emergency Medicine [phone] Glory Turner MD Emergency Medicine [phone] Brennan Rosario DO Internal Medicine [phone] Alejandro Clemente MD Family Practice [phone] Pina Peters MD Internal Medicine [phone] Your Primary Care Physician (PCP) Primary Care Physician Office Phone Office Fax Savanna Mahesh [phone] You are allergic to the following Allergen Reactions Levaquin (Levofloxacin) Hives Pcn (Penicillins) Hives Recent Documentation Height Weight BMI OB Status Smoking Status 1.702 m 59.9 kg 20.67 kg/m2 Postmenopausal Never Smoker Emergency Contacts Name Discharge Info Relation Home Work Mobile Ida Lester DECLINED CAREGIVER [4] Child [2] [phone] Sultana Banuelos DECLINED CAREGIVER [4] Child [2] [phone] Patient Belongings The following personal items are in your possession at time of discharge: 
  Dental Appliances: None  Visual Aid: None      Home Medications: None   Jewelry: None  Clothing: At bedside, Socks, Shirt, Pants, Other (comment) (tennis shoes)    Other Valuables: None Please provide this summary of care documentation to your next provider.  
  
  
 
  
Signatures-by signing, you are acknowledging that this After Visit Summary has been reviewed with you and you have received a copy. Patient Signature:  ____________________________________________________________ Date:  ____________________________________________________________  
  
[employer] Provider Signature:  ____________________________________________________________ Date:  ____________________________________________________________

## 2017-11-16 NOTE — ED PROVIDER NOTES
HPI Comments: Beena Ayers is a 71year old female who presents to the ED with a c/o numbness and weakness in the bilateral lower legs. Pt states she has had several falls, and came in for evaluation of this phenomenon. States she has not addressed this with her PCP. Denies pain, adding that she has not self-medicated this issue, and nothing makes  better or worse. Patient is a 71 y.o. female presenting with weakness and fall. The history is provided by the patient. History limited by: No communication barrier. Extremity Weakness    This is a new problem. Episode onset: Pt states the symptoms have been persistent for two months. The patient is experiencing no pain. Pertinent negatives include full range of motion. She has tried nothing for the symptoms. Fall          Past Medical History:   Diagnosis Date    AIDS ([facility] [address]     Asthma     Hypertension        History reviewed. No pertinent surgical history. History reviewed. No pertinent family history. Social History     Social History    Marital status: LEGALLY      Spouse name: N/A    Number of children: N/A    Years of education: N/A     Occupational History    Not on file. Social History Main Topics    Smoking status: Never Smoker    Smokeless tobacco: Never Used    Alcohol use No    Drug use: No    Sexual activity: Not on file     Other Topics Concern    Not on file     Social History Narrative         ALLERGIES: Pcn [penicillins]    Review of Systems   HENT: Negative. Eyes: Negative. Respiratory: Negative. Cardiovascular: Negative. Gastrointestinal: Negative. Endocrine: Negative. Genitourinary: Negative. Musculoskeletal: Negative. Skin: Negative. Allergic/Immunologic: Negative. Neurological: Positive for weakness. Hematological: Negative. Psychiatric/Behavioral: Negative.         Vitals:    11/16/17 1515 11/16/17 1545 11/16/17 1600 11/16/17 1630   BP: 148/71 132/70 144/75 151/77 Pulse: 90 90 86 90   Resp: 14 13 14 16   Temp:       SpO2: 100% 99% 99% 100%   Weight:       Height:                Physical Exam   Constitutional: She is oriented to person, place, and time. She appears well-developed and well-nourished. No distress. HENT:   Head: Normocephalic and atraumatic. Eyes: EOM are normal. Pupils are equal, round, and reactive to light. Neck: Normal range of motion. Neck supple. Cardiovascular: Normal rate, regular rhythm, normal heart sounds and intact distal pulses. Pulmonary/Chest: Effort normal and breath sounds normal. No respiratory distress. She has no wheezes. She has no rales. No adventitious breath sounds. Abdominal: Soft. Bowel sounds are normal. There is no tenderness. There is no rebound and no guarding. Genitourinary:   Genitourinary Comments: NE   Musculoskeletal: Normal range of motion. Neurological: She is alert and oriented to person, place, and time. No cranial nerve deficit. She exhibits normal muscle tone. Coordination normal.   There is no facial drooping or slurring of speech. Pt has full and appropriate bilateral upper and lower extremity strength and movement. There are no focal deficits. Skin: Skin is warm and dry. Psychiatric: She has a normal mood and affect. Nursing note and vitals reviewed. MDM  Number of Diagnoses or Management Options  Elevated CK:   Numbness and tingling of both legs:   Renal insufficiency:   Diagnosis management comments: PROGRESS NOTE:  Pt is requesting to go home, and does not want or desire further evaluation. Eitan Pollock NP  6:06 PM    PROGRESS NOTE:  Care of the Pt has been turned over to JA Hawkins PA-C at time of shift change. Eitan Pollock NP  6:45 PM    PLAN: Pt is to be evaluated by Neurology to determine disposition.          Amount and/or Complexity of Data Reviewed  Tests in the radiology section of CPT®: ordered and reviewed    Risk of Complications, Morbidity, and/or Mortality  Presenting problems: low  Diagnostic procedures: low  Management options: low      ED Course       Procedures    Diagnosis:   1. Numbness and tingling of both legs    2. Renal insufficiency    3. Elevated CK      CONSULT:  Spoke with Dr Darian Lund, who advised he would evaluate the Pt. Tawanna Campos NP   6:55 PM    Disposition:   DISPOSITION PENDING. Follow-up Information     Follow up With Details Comments [address], [place] Call [address]  [address]  [place] [id number] [facility] Eder Barros MD Call IN THE MORNING FOR A FOLLOW UP EVALUATION. 300 [facility] [address]  [facility]  [phone]            Patient's Medications   Start Taking    No medications on file   Continue Taking    ABACAVIR-LAMIVUDINE (EPZICOM) 600-300 MG TABLET    Take 1 Tab by mouth daily. ACETAMINOPHEN (TYLENOL EXTRA STRENGTH) 500 MG TABLET    Take 2 Tabs by mouth every six (6) hours as needed for Pain. DARUNAVIR (PREZISTA) 600 MG TABLET    Take 600 mg by mouth two (2) times a day. GABAPENTIN (NEURONTIN) 100 MG CAPSULE    Take 1 Cap by mouth three (3) times daily. IBUPROFEN (MOTRIN) 400 MG TABLET    Take 1 Tab by mouth every six (6) hours as needed for Pain. MIRTAZAPINE (REMERON) 45 MG TABLET    Take 45 mg by mouth nightly. RITONAVIR (NORVIR) 100 MG TABLET    Take 100 mg by mouth two (2) times daily (with meals).    These Medications have changed    No medications on file   Stop Taking    No medications on file

## 2017-11-16 NOTE — IP AVS SNAPSHOT
Flakita Grimm 
 
 
 [address] 
[phone] Patient: Richard Barclay MRN: [id number] :1945 My Medications STOP taking these medications   
 ibuprofen 400 mg tablet Commonly known as:  MOTRIN  
   
  
  
TAKE these medications as instructed Instructions Each Dose to Equal  
 Morning Noon Evening Bedtime  
 acetaminophen 500 mg tablet Commonly known as:  [address] Ramesh  Your last dose was: Your next dose is: Take 2 Tabs by mouth every six (6) hours as needed for Pain. 1000 mg  
    
   
   
   
  
 amLODIPine 10 mg tablet Commonly known as:  Turner Nayely Start taking on:  2017 Your last dose was: Your next dose is: Take 1 Tab by mouth daily. 10 mg  
    
   
   
   
  
 aspirin 325 mg tablet Commonly known as:  ASPIRIN Start taking on:  2017 Your last dose was: Your next dose is: Take 1 Tab by mouth daily. 325 mg  
    
   
   
   
  
 atorvastatin 40 mg tablet Commonly known as:  LIPITOR Your last dose was: Your next dose is: Take 1 Tab by mouth nightly. 40 mg  
    
   
   
   
  
 EPZICOM 600-300 mg tablet Generic drug:  abacavir-lamiVUDine Your last dose was: Your next dose is: Take 1 Tab by mouth daily. 1 Tab  
    
   
   
   
  
 gabapentin 100 mg capsule Commonly known as:  NEURONTIN Your last dose was: Your next dose is: Take 1 Cap by mouth three (3) times daily. 100 mg  
    
   
   
   
  
 mirtazapine 45 mg tablet Commonly known as:  Dashawn Doles Your last dose was: Your next dose is: Take 45 mg by mouth nightly. 45 mg  
    
   
   
   
  
 NORVIR 100 mg tablet Generic drug:  ritonavir Your last dose was: Your next dose is: Take 100 mg by mouth two (2) times daily (with meals). 100 mg PREZISTA 600 mg tablet Generic drug:  darunavir Your last dose was: Your next dose is: Take 600 mg by mouth two (2) times a day. 600 mg  
    
   
   
   
  
 trimethoprim-sulfamethoxazole 160-800 mg per tablet Commonly known as:  BACTRIM DS Your last dose was: Your next dose is: Take 1 Tab by mouth two (2) times a day. 1 Tab Where to Get Your Medications Information on where to get these meds will be given to you by the nurse or doctor. ! Ask your nurse or doctor about these medications  
  amLODIPine 10 mg tablet  
 aspirin 325 mg tablet  
 atorvastatin 40 mg tablet  
 trimethoprim-sulfamethoxazole 160-800 mg per tablet

## 2017-11-16 NOTE — ED NOTES
I performed a brief evaluation, including history and physical, of the patient here in triage and I have determined that pt will need further treatment and evaluation from the main side ER physician. I have placed initial orders to help in expediting patients care.      November 16, 2017 at 2:56 PM - Marsha Gomez MD        Visit Vitals    BP (!) 175/100 (BP 1 Location: Right arm, BP Patient Position: At rest)    Pulse (!) 103    Temp 97.7 °F (36.5 °C)    Resp 16    Ht 5' 7\" (1.702 m)    Wt 59.9 kg (132 lb)    SpO2 100%    BMI 20.67 kg/m2

## 2017-11-17 LAB
ALBUMIN SERPL-MCNC: 3.7 G/DL (ref 3.4–5)
ALBUMIN/GLOB SERPL: 0.6 {RATIO} (ref 0.8–1.7)
ALP SERPL-CCNC: 60 U/L (ref 45–117)
ALT SERPL-CCNC: 26 U/L (ref 13–56)
ANION GAP SERPL CALC-SCNC: 9 MMOL/L (ref 3–18)
AST SERPL-CCNC: 34 U/L (ref 15–37)
ATRIAL RATE: 101 BPM
BASOPHILS # BLD: 0 K/UL (ref 0–0.06)
BASOPHILS NFR BLD: 0 % (ref 0–2)
BILIRUB SERPL-MCNC: 0.9 MG/DL (ref 0.2–1)
BUN SERPL-MCNC: 26 MG/DL (ref 7–18)
BUN/CREAT SERPL: 23 (ref 12–20)
CALCIUM SERPL-MCNC: 9.4 MG/DL (ref 8.5–10.1)
CALCULATED P AXIS, ECG09: 64 DEGREES
CALCULATED R AXIS, ECG10: 13 DEGREES
CALCULATED T AXIS, ECG11: 78 DEGREES
CHLORIDE SERPL-SCNC: 103 MMOL/L (ref 100–108)
CK SERPL-CCNC: 252 U/L (ref 26–192)
CO2 SERPL-SCNC: 25 MMOL/L (ref 21–32)
CREAT SERPL-MCNC: 1.11 MG/DL (ref 0.6–1.3)
DIAGNOSIS, 93000: NORMAL
DIFFERENTIAL METHOD BLD: ABNORMAL
EOSINOPHIL # BLD: 0.3 K/UL (ref 0–0.4)
EOSINOPHIL NFR BLD: 5 % (ref 0–5)
ERYTHROCYTE [DISTWIDTH] IN BLOOD BY AUTOMATED COUNT: 13.9 % (ref 11.6–14.5)
GLOBULIN SER CALC-MCNC: 6 G/DL (ref 2–4)
GLUCOSE SERPL-MCNC: 86 MG/DL (ref 74–99)
HCT VFR BLD AUTO: 41.1 % (ref 35–45)
HGB BLD-MCNC: 13.6 G/DL (ref 12–16)
LYMPHOCYTES # BLD: 1.7 K/UL (ref 0.9–3.6)
LYMPHOCYTES NFR BLD: 32 % (ref 21–52)
MCH RBC QN AUTO: 28.8 PG (ref 24–34)
MCHC RBC AUTO-ENTMCNC: 33.1 G/DL (ref 31–37)
MCV RBC AUTO: 87.1 FL (ref 74–97)
MONOCYTES # BLD: 0.4 K/UL (ref 0.05–1.2)
MONOCYTES NFR BLD: 7 % (ref 3–10)
NEUTS SEG # BLD: 3.1 K/UL (ref 1.8–8)
NEUTS SEG NFR BLD: 56 % (ref 40–73)
P-R INTERVAL, ECG05: 158 MS
PLATELET # BLD AUTO: 218 K/UL (ref 135–420)
PMV BLD AUTO: 12 FL (ref 9.2–11.8)
POTASSIUM SERPL-SCNC: 3.8 MMOL/L (ref 3.5–5.5)
PROT SERPL-MCNC: 9.7 G/DL (ref 6.4–8.2)
Q-T INTERVAL, ECG07: 368 MS
QRS DURATION, ECG06: 92 MS
QTC CALCULATION (BEZET), ECG08: 477 MS
RBC # BLD AUTO: 4.72 M/UL (ref 4.2–5.3)
SODIUM SERPL-SCNC: 137 MMOL/L (ref 136–145)
VENTRICULAR RATE, ECG03: 101 BPM
VIT B12 SERPL-MCNC: 339 PG/ML (ref 211–911)
WBC # BLD AUTO: 5.5 K/UL (ref 4.6–13.2)

## 2017-11-17 PROCEDURE — 80053 COMPREHEN METABOLIC PANEL: CPT | Performed by: HOSPITALIST

## 2017-11-17 PROCEDURE — 74011250636 HC RX REV CODE- 250/636: Performed by: HOSPITALIST

## 2017-11-17 PROCEDURE — 65660000000 HC RM CCU STEPDOWN

## 2017-11-17 PROCEDURE — 77030020253 HC SOL INJ D545NS .05 DEX .45 SAL

## 2017-11-17 PROCEDURE — 82607 VITAMIN B-12: CPT | Performed by: HOSPITALIST

## 2017-11-17 PROCEDURE — 97535 SELF CARE MNGMENT TRAINING: CPT

## 2017-11-17 PROCEDURE — 36415 COLL VENOUS BLD VENIPUNCTURE: CPT | Performed by: HOSPITALIST

## 2017-11-17 PROCEDURE — 74011250637 HC RX REV CODE- 250/637: Performed by: HOSPITALIST

## 2017-11-17 PROCEDURE — 82550 ASSAY OF CK (CPK): CPT | Performed by: HOSPITALIST

## 2017-11-17 PROCEDURE — 74011250636 HC RX REV CODE- 250/636: Performed by: EMERGENCY MEDICINE

## 2017-11-17 PROCEDURE — 85025 COMPLETE CBC W/AUTO DIFF WBC: CPT | Performed by: HOSPITALIST

## 2017-11-17 PROCEDURE — 97530 THERAPEUTIC ACTIVITIES: CPT

## 2017-11-17 PROCEDURE — 97165 OT EVAL LOW COMPLEX 30 MIN: CPT

## 2017-11-17 PROCEDURE — 97162 PT EVAL MOD COMPLEX 30 MIN: CPT

## 2017-11-17 PROCEDURE — 74011000258 HC RX REV CODE- 258: Performed by: HOSPITALIST

## 2017-11-17 RX ADMIN — GUAIFENESIN 600 MG: 600 TABLET, EXTENDED RELEASE ORAL at 09:45

## 2017-11-17 RX ADMIN — DEXTROSE MONOHYDRATE AND SODIUM CHLORIDE 100 ML/HR: 5; .45 INJECTION, SOLUTION INTRAVENOUS at 01:00

## 2017-11-17 RX ADMIN — HEPARIN SODIUM 5000 UNITS: 5000 INJECTION, SOLUTION INTRAVENOUS; SUBCUTANEOUS at 18:00

## 2017-11-17 RX ADMIN — HEPARIN SODIUM 5000 UNITS: 5000 INJECTION, SOLUTION INTRAVENOUS; SUBCUTANEOUS at 23:04

## 2017-11-17 RX ADMIN — LEVOFLOXACIN 250 MG: 250 INJECTION, SOLUTION INTRAVENOUS at 22:33

## 2017-11-17 RX ADMIN — HEPARIN SODIUM 5000 UNITS: 5000 INJECTION, SOLUTION INTRAVENOUS; SUBCUTANEOUS at 09:46

## 2017-11-17 RX ADMIN — GUAIFENESIN 600 MG: 600 TABLET, EXTENDED RELEASE ORAL at 22:33

## 2017-11-17 RX ADMIN — HEPARIN SODIUM 5000 UNITS: 5000 INJECTION, SOLUTION INTRAVENOUS; SUBCUTANEOUS at 02:36

## 2017-11-17 NOTE — PROGRESS NOTES
conducted an initial consultation and Spiritual Assessment for Zoila Sorto, who is a 71 y.o.,female. Patients Primary Language is: [place]. According to the patients EMR Zoroastrian Affiliation is: No Taoist. The reason the Patient came to the hospital is:   Patient Active Problem List    Diagnosis Date Noted    Physical deconditioning 11/16/2017    JOSÉ LUIS (acute kidney injury) ([facility] [address] 11/16/2017        The  provided the following Interventions:  Initiated a relationship of care and support with patient in room 3034 this morning. Listened empathically as patient talked about being here and how she was feeling hungry and wanted something to eat. Patient however is NPO and cannot have anything and was informed of that. Provided information about Spiritual Care Services. Offered prayer and assurance of continued prayers on patients behalf. The following outcomes were achieved:  Patient shared limited information about her  medical narrative and spiritual journey/beliefs. Patient processed feeling about current hospitalization. Patient expressed gratitude for pastoral care visit. Assessment:  Patient does not have any Jewish/cultural needs that will affect patients preferences in health care. There are no further spiritual or Jewish issues which require Spiritual Care Services interventions at this time. Plan:  Chaplains will continue to follow and will provide pastoral care on an as needed/requested basis    . Amanda Richard   Spiritual Care   [phone]

## 2017-11-17 NOTE — ED NOTES
Marva Ca is a 71 y.o. female with history of HIV and Asthma presents with two day history of constant  bilateral leg weakness.  has been falling frequently, has some numbness and tingling. States this is a new onset.  came in because she is tired of falling all the time.  believes it may be from having to break up her medication to swallow.  medication gets stuck in throat at times. Denies any pain at this time. Constitutional:  Denies malaise, fever, chills. Head:  Denies injury. Face:  Denies injury or pain. ENMT:  Denies sore throat. Neck:  Denies injury or pain. Chest:  Denies injury. Cardiac:  Denies chest pain or palpitations. Respiratory:  Denies cough, wheezing, difficulty breathing, shortness of breath. GI/ABD:  Denies injury, pain, distention, nausea, vomiting, diarrhea. :  Denies injury, pain, dysuria or urgency. Back:  Denies injury or pain. Pelvis:  Denies injury or pain. Extremity/MS: Bilateral leg weakness   Neuro:  Denies headache, LOC, dizziness, neurologic symptoms/deficits/paresthesias. Skin: Denies injury, rash, itching or skin changes. CONSTITUTIONAL: Alert but slow to recall day and month, in no apparent distress; well-developed; well-nourished. HEAD:  Normocephalic, atraumatic. EYES: PERRL; EOM's intact. ENTM: Nose: No rhinorrhea; Throat: mucous membranes moist. Posterior pharynx-normal. Slight asymmetry in mouth and eye lids  Neck:  No JVD, supple without lymphadenopathy. RESP: Chest clear, equal breath sounds. CV: S1 and S2 WNL; No murmurs, gallops or rubs. GI: Abdomen soft and non-tender. No masses or organomegaly. UPPER EXT:  Normal inspection. Good  strength, equal bilat, no pronator drift, good sensation  LOWER EXT: Normal inspection. Good strength, equal bilat, sensation intact  NEURO: CN 3-12 grossly intactstrength 5/5  sensation intact. SKIN: No rashes; Normal for age and stage.    PSYCH:  Alert and oriented, normal affect. DDX; Pt was a turn over from  [address] When Pt was originally to be discharged Pt was unable to ambulate without assistance. Appeared to be slightly slow and deliberate in her gait. Neurology was consulted at that time. Neurology then contacted me and advised that Pt needed to be admitted with MRI of brain with and without contrast as well as Lumbar spine. Requested CD4 count. States he believes that her story is unreliable due to abnormal CT of brain. Spoke with Hospitalist who requested further evaluation. Hospitalist spoke with Dr. Amadeo Almanzar and agreed to admit Pt.

## 2017-11-17 NOTE — PROGRESS NOTES
Offered the pt FOC for home care, she chose [facility]. Pt verified the contact information on the face sheet is correct. Referral sent to intake via [facility] and called to the [facility] as a pending dc. Delivered the pt a Rolling walker from the [employer]. Faxed the referral and the delivery ticket to [facility] for processing.

## 2017-11-17 NOTE — PROGRESS NOTES
Evaluation completed formal note to follow patient to benefit from home health PT and rolling walker for home.

## 2017-11-17 NOTE — MANAGEMENT PLAN
[facility]   Discharge Planning/ Assessment    Reasons for Intervention:   Interviewed patient with permission of  in room. Verified demographics listed on face sheet with patient; all information correct. Pt has [place] Medicaid for insurance. Patient stated their PCP is Dr María Lucio and last appt in Oct. Patient lives alone. Patient's NOK is , [address] at [phone]. They are  but still friendly. She states that daughter, Sheryl Jenkins at [phone] comes almost daily and checks on her and she has a son and another daughter that come often Patient moderate with independence with ADLs prior to admission. Family assisting more. Pt states she is wobbling more and has had some falls. DME prior to admission: cane. Recommended rolling walker to pt and she is agreeable.  Discharge plan is Home with Home PT and rolling walker  1400: Verbal order, read back for home health and rolling walker  Mary Ann Trejo RN BSN  Outcomes Manager  Pager # [phone]    High Risk Criteria  [x] Yes  []No   Physician Referral  [] Yes  []No        Date    Nursing Referral  [] Yes  []No        Date    Patient/Family Request  [] Yes  []No        Date       Resources:    Medicare  [] Yes  [x]No   Medicaid  [x] Yes  []No   No Resources  [] Yes  []No   Private Insurance  [] Yes  []No    Name/Phone Number    Other  [] Yes  []No        (i.e. Workman's Comp)         Prior Services:    Prior Services  [x] Yes  []No   Home Health  [] Yes  []No   [address]  [] Yes  []No        Number of [address]  [] Yes  []No       Meals on Wheels  [] Yes  []No   Office on Aging  [] Yes  []No   Transportation Services  [] Yes  []No   Nursing Home  [] Yes  []No        Nursing Home Name    [address]  [] Yes  []No        [address] Name    Other       Information Source:      Information obtained from  [x] Patient  [] Parent   [] [address]  [] Child  [] Spouse [] Significant Other/Partner   [] Friend      [] EMS    [] Nursing Home Chart          [] Other:   Chart Review  [x] Yes  []No     Family/Support System:    Patient lives with  [x] Alone    [] Spouse   [] Significant Other  [] Children  [] Caretaker   [] Parent  [] Sibling     [] Other       Other Support System:    Is the patient responsible for care of others  [] Yes  [x]No   Information of person caring for patient on  discharge    Managers financial affairs independently  [x] Yes  []No   If no, explain:      Status Prior to Admission:    Mental Status  [x] Awake  [x] Alert  [x] Oriented  [] Quiet/Calm [] Lethargic/Sedated   [] Disoriented  [] Restless/Anxious  [] Combative   Personal Care  [] Dependent  [x] [address]  [] Requires Assistance   Meal Preparation Ability  [] Independent   [] Standby Assistance   [] Minimal Assistance   [x] Moderate Assistance  [] Maximum Assistance     [] Total Assistance   Chores  [] Independent with Chores   [] N/A Nursing Home Resident   [x] Requires Assistance   Bowel/Bladder  [x] Continent  [] Catheter  [] Incontinent  [] Ostomy Self-Care    [] Urine Diversion Self-Care  [] Maximum Assistance     [] Total Assistance   Number of Persons needed for assistance    DME at home  [] Edin Likes, Bayron Lunch  [x] Nanine Likes, Straight   [] Commode    [] Bathroom/Grab Bars  [] Hospital Bed  [] Nebulizer  [] Oxygen           [] Raised Toilet Seat  [] Shower Chair  [] Side Rails for Bed   [] Tub Transfer Bench   [] Edi Aldridge  [] Luellen Canavan, Standard      [] Other:   Vendor      Treatment Presently Receiving:    Current Treatments  [] Chemotherapy  [] Dialysis  [] Insulin  [] IVAB [x] IVF   [] O2  [] PCA   [] PT   [] RT   [] Tube Feedings   [] Wound Care     Psychosocial Evaluation:    Verbalized Knowledge of Disease Process  [] Patient  []Family   Coping with Disease Process  [] Patient  []Family   Requires Further Counseling Coping with Disease Process  [] Patient  []Family     Identified Projected Needs:    Home Health Aid  [] Yes  []No   Transportation  [] Yes  []No   Education  [] Yes  []No        Specific Education     Financial Counseling  [] Yes  []No   Inability to Care for Self/Will Require 24 hour care  [] Yes  []No   Pain Management  [] Yes  []No   Home Infusion Therapy  [] Yes  []No   Oxygen Therapy  [] Yes  []No   DME  [] Yes  []No   Long Term Care Placement  [] Yes  []No   Rehab  [] Yes  []No   Physical Therapy  [x] Yes  []No   Needs Anticipated At This Time  [x] Yes  []No     Intra-Hospital Referral:    [facility]  [x] Yes  []No     [] Yes  []No   Patient Representative  [] Yes  []No   Staff for Teaching Needs  [] Yes  []No   Specialty Teaching Needs     Diabetic Educator  [] Yes  []No   Referral for Diabetic Educator Needed  [] Yes  []No  If Yes, place order for Nutritionist or Diabetic Consult     Tentative Discharge Plan:    Home with No Services  [] Yes  [x]No   Home with Home Health Follow-up  [] Yes  []No        If Yes, specify type    Home Care Program  [x] Yes  []No        If Yes, specify type SN, PT/OT   Meals on Wheels  [] Yes  []No   Office of Aging  [] Yes  []No   NHP  [] Yes  []No   Return to the Nursing Home  [] Yes  []No   Rehab Therapy  [] Yes  []No   Acute Rehab  [] Yes  []No   Subacute Rehab  [] Yes  []No   Private Care  [] Yes  []No   Substance Abuse Referral  [] Yes  []No   Transportation  [] Yes  []No   Chore Service  [] Yes  []No   Inpatient Hospice  [] Yes  []No   OP RT  [] Yes  [] No   OP Hemo  [] Yes  [] No   OP PT  [] Yes  []No   Support Group  [] Yes  []No   Reach to Recovery  [] Yes  []No   OP Oncology Clinic  [] Yes  []No   Clinic Appointment  [] Yes  []No   DME  [] Yes  []No   Comments    Name of D/C Planner or  Given to Patient or Family Abby Shaw RN BSN  Outcomes Manager  Pager # [phone]   Phone Number         Extension    Date 11/17/2017   Time 1000   If you are discharged home, whom do you designate to participate in your discharge plan and receive any information needed?      Enter name of designee daughter        Phone # of designee         Address of designee         Updated         Patient refused to designate any           individual

## 2017-11-17 NOTE — PROGRESS NOTES
Problem: Falls - Risk of  Goal: *Absence of Falls  Document Gerardo Fall Risk and appropriate interventions in the flowsheet.    Outcome: Progressing Towards Goal  Fall Risk Interventions:  Mobility Interventions: Patient to call before getting OOB         Medication Interventions: Patient to call before getting OOB    Elimination Interventions: Call light in reach    History of Falls Interventions: Room close to nurse's station

## 2017-11-17 NOTE — ACP (ADVANCE CARE PLANNING)
Patient has designated ___daughter_____________________ to participate in his/her discharge plan and to receive any needed information.      Name: Brandyjoe Crespo  [address] [place] [address], [place]  Phone [phone]

## 2017-11-17 NOTE — PROGRESS NOTES
Problem: Self Care Deficits Care Plan (Adult)  Goal: *Acute Goals and Plan of Care (Insert Text)  Occupational Therapy Goals  Initiated 11/17/2017 within 7 day(s). 1.  Patient will perform grooming tasks while standing with supervision for balance. 2.  Patient will perform lower body dressing with supervision and minimal verbal cues for safety. 3.  Patient will perform functional task in standing for 8 minutes with supervision for balance to increase activity tolerance for increased participation in ADLs. 4.  Patient will perform toilet transfers with supervision/set-up. 5.  Patient will perform all aspects of toileting with supervision/set-up. 6.  Patient will participate in upper extremity therapeutic exercise/activities with supervision/set-up for 8 minutes to increase BUE strength for ADLs. 7.  Patient will utilize energy conservation techniques during functional activities with minimal verbal cues. Outcome: Progressing Towards Goal  Occupational Therapy EVALUATION    Patient: Preston Mejias (61 y.o. female)  Date: 11/17/2017  Primary Diagnosis: JOSÉ LUIS (acute kidney injury) [facility])  Physical deconditioning        Precautions:  Fall    ASSESSMENT :  Based on the objective data described below, the patient presents with impairments with regard to bed mobility, activity tolerance, balance and independence in ADLs. Pt maneuvering to bathroom with [employer] RN on awareness. Decreased safety awareness during toilet transfer; skilled instruction and minimal verbal cues for positioning & use of grab bar. CGA for pericare due to fair- standing balance; mod A for balance while standing at sink during hand hygiene; pt leaning posteriorly; mod/max vc's for positioning to ensure safety. Min/mod A to return to bed; re-education on appropriate transfer techniques to ensure safety. Pt educated on role of OT and Mariza Wilkes; she verbalized understanding. Pt left supine with needs within reach; family at bedside.  Recommend continued therapy for increased independence in ADLs & functional mobility. Patient will benefit from skilled intervention to address the above impairments. Patients rehabilitation potential is considered to be Good  Factors which may influence rehabilitation potential include:   []             None noted  []             Mental ability/status  [x]             Medical condition  []             Home/family situation and support systems  []             Safety awareness  []             Pain tolerance/management  []             Other:        PLAN :  Recommendations and Planned Interventions:  [x]               Self Care Training                  [x]        Therapeutic Activities  [x]               Functional Mobility Training    []        Cognitive Retraining  [x]               Therapeutic Exercises           [x]        Endurance Activities  [x]               Balance Training                   []        Neuromuscular Re-Education  []               Visual/Perceptual Training     [x]   Home Safety Training  [x]               Patient Education                 [x]        Family Training/Education  []               Other (comment):    Frequency/Duration: Patient will be followed by occupational therapy 3-5 times a week to address goals. Discharge Recommendations: Home Health (pending progress during hospitalization)  Further Equipment Recommendations for Discharge: shower chair     SUBJECTIVE:   Patient stated Did I see you yesterday in the ER?\"    OBJECTIVE DATA SUMMARY:     Past Medical History:   Diagnosis Date    AIDS ([facility] [address]     Asthma     Hypertension    History reviewed. No pertinent surgical history. Barriers to Learning/Limitations: None  Compensate with: visual, verbal, tactile, kinesthetic cues/model    GCODES:  Self Care  Current  CJ= 20-39%   Goal  CI= 1-19%.   The severity rating is based on the Other Functional Assessment, MMT, ROm    Eval Complexity: History: LOW Complexity : Brief history review ; Examination: LOW Complexity : 1-3 performance deficits relating to physical, cognitive , or psychosocial skils that result in activity limitations and / or participation restrictions ; Decision Making:LOW Complexity : No comorbidities that affect functional and no verbal or physical assistance needed to complete eval tasks     Prior Level of Function/Home Situation: Pt was modified independent with basic self care tasks and functional mobility PTA. Home Situation  Home Environment: Apartment  # Steps to Enter: 3  Rails to Enter: Yes  Hand Rails : Bilateral (cannot reach both simulatenously)  One/Two Story Residence: One story  Living Alone: Yes  Support Systems: Family member(s)  Patient Expects to be Discharged to[de-identified] Apartment  Current DME Used/Available at Home: Cane, straight, Commode, bedside  Tub or Shower Type: Tub/Shower combination (no shower chair or grab bars)  []  Right hand dominant   [x]  Left hand dominant    Cognitive/Behavioral Status:  Neurologic State: Alert  Orientation Level: Oriented X4  Cognition: Follows commands  Safety/Judgement: Awareness of environment; Fall prevention     Skin: Intact (BUEs)  Edema: None noted (BUEs)    Vision/Perceptual:    Acuity: Within Defined Limits      Coordination:  Coordination: Within functional limits (BUEs)  Fine Motor Skills-Upper: Right Intact; Left Intact    Gross Motor Skills-Upper: Right Intact; Left Intact     Balance:  Sitting: Impaired  Sitting - Static: Good (unsupported)  Sitting - Dynamic: Fair (occasional)  Standing: Impaired  Standing - Static: Fair  Standing - Dynamic : Fair (Fair-/Poor+)     Strength:  Strength: Generally decreased, functional (BUEs:  4/5)    Range of Motion:  AROM: Within functional limits (BUEs: full shoulder/elbow flexion)    Functional Mobility and Transfers for ADLs:  Bed Mobility:  Supine to Sit:  (pt standing with RN On arrival)  Sit to Supine: Stand-by asssistance    Transfers:  Sit to Stand: Contact guard assistance;Minimum assistance; Additional time;Assist x1   Toilet Transfer : Contact guard assistance;Minimum assistance; Additional time;Assist x1    ADL Assessment:  Feeding: Setup;Supervision  Oral Facial Hygiene/Grooming: Minimum assistance  Bathing: Moderate assistance  Upper Body Dressing: Setup;Supervision  Lower Body Dressing: Contact guard assistance;Minimum assistance  Toileting: Contact guard assistance    ADL Intervention:  Grooming  Grooming Assistance: Moderate assistance (for balance at sink)  Washing Hands: Stand-by assistance  Cues: Physical assistance;Verbal cues provided (for balance)    Toileting  Toileting Assistance: Minimum assistance  Bladder Hygiene: Contact guard assistance  Clothing Management: Minimum assistance  Cues: Physical assistance for pants up;Physical assistance for pants down;Verbal cues provided  Adaptive Equipment: Grab bars    CGA/min A for toilet transfer with skilled instruction on positioning to ensure safety and minimal verbal cues to utilize grab bar for support. CGA for pericare due to fair- standing balance; mod A for balance while standing at sink during hand hygiene; pt leaning posteriorly; mod/max vc's for positioning to ensure safety. Cognitive Retraining  Safety/Judgement: Awareness of environment; Fall prevention    Pain:  Pre treatment pain level: 0/10  Post treatment pain level: 0/10  Pain Scale 1: Numeric (0 - 10)  Pain Intensity 1: 0    Activity Tolerance:  Fair  Please refer to the flowsheet for vital signs taken during this treatment. After treatment:   [] Patient left in no apparent distress sitting up in chair  [x] Patient left in no apparent distress in bed  [x] Call bell left within reach  [x] Nursing notified  [x] Caregiver present  [] Bed alarm activated    COMMUNICATION/EDUCATION: Pt/family educated on role of OT, POC, and home safety. They verbalized understanding.    [x] Home safety education was provided and the patient/caregiver indicated understanding. [x] Patient/family have participated as able in goal setting and plan of care. [x] Patient/family agree to work toward stated goals and plan of care. [] Patient understands intent and goals of therapy, but is neutral about his/her participation. [] Patient is unable to participate in goal setting and plan of care.     Thank you for this referral.    Chica Dash, MS OTR/L  Time Calculation: 17 mins

## 2017-11-17 NOTE — PROGRESS NOTES
Patient is pleasant female who came in for numbness in her feet. She states she has fallen the last few weeks. Has no history of DM. She ema pain. She gets up frequently to go to bathroom, but denies burns or itch. She is blind in right eye. She ambulates with cane. No other concerns on this shift. Bedside and Verbal shift change report given to [facility] (oncoming nurse) by BODØ (offgoing nurse). Report included the following information SBAR, ED Summary, MAR, Recent Results and Med Rec Status.

## 2017-11-17 NOTE — PROGRESS NOTES
Care Management Interventions  PCP Verified by CM: Yes (Dr Marcial Harden)  Last Visit to PCP: 10/17/17  Mode of Transport at Discharge:  Other (see comment)  Transition of Care Consult (CM Consult): [address]: Yes  Discharge Durable Medical Equipment: Yes (r/w)  Physical Therapy Consult: Yes  Occupational Therapy Consult: Yes  Current Support Network: Own Home, Lives Alone (son)  Confirm Follow Up Transport: Family  Plan discussed with Pt/Family/Caregiver: Yes  Freedom of Choice Offered: Yes  Discharge Location  Discharge Placement: Home with home health

## 2017-11-17 NOTE — PROGRESS NOTES
Nutrition initial assessment/Plan of care      RECOMMENDATIONS:     1. Advance diet when medically indicated  2. Monitor weight. labs and PO intake  3. RD to follow     GOALS:     1. PO intake meets >75% of protein/calorie needs by 11/22  2. Weight Maintenance (+/- 1-2 lb by 11/22)       ASSESSMENT:     Weight status is classified as normal per BMI of 20.7. However, patient is at nutrition risk due to BMI below 23 with patient above 72years of age. Labs noted. Nutrition recommendations listed. RD to follow. Nutrition Diagnoses:   *** related to *** as evidenced by ***    Nutrition Risk:  [] High  [x] Moderate []  Low    SUBJECTIVE/OBJECTIVE:      Patient admitted with AMS and frequent falls. Currently with NPO diet order. Will monitor. Information Obtained from:    [x] Chart Review   [] Patient   [] Family/Caregiver   [] Nurse/Physician   [] Interdisciplinary Meeting/Rounds    Diet: NPO  Medications: [] Reviewed    Allergies: [] Reviewed   Encounter Diagnoses     ICD-10-CM ICD-9-CM   1. Numbness and tingling of both legs R20.0 782.0    R20.2    2. Renal insufficiency N28.9 593.9   3. Elevated CK R74.8 790.5   4. Altered mental status, unspecified altered mental status type R41.82 780.97   5. History of HIV infection Z86.19 V12.09   6. Frequent falls R29.6 V15.88   7.  Acute kidney injury ([facility] [address] N17.9 584.9     Past Medical History:   Diagnosis Date    AIDS ([facility]     Asthma     Hypertension       Labs:    Lab Results   Component Value Date/Time    Sodium 137 11/17/2017 06:08 AM    Potassium 3.8 11/17/2017 06:08 AM    Chloride 103 11/17/2017 06:08 AM    CO2 25 11/17/2017 06:08 AM    Anion gap 9 11/17/2017 06:08 AM    Glucose 86 11/17/2017 06:08 AM    BUN 26 11/17/2017 06:08 AM    Creatinine 1.11 11/17/2017 06:08 AM    Calcium 9.4 11/17/2017 06:08 AM    Albumin 3.7 11/17/2017 06:08 AM     Anthropometrics: BMI (calculated): 20.7  Last 3 Recorded Weights in this Encounter    11/16/17 1453   Weight: 59.9 kg (132 lb)      Ht Readings from Last 1 Encounters:   11/16/17 5' 7\" (1.702 m)     No data found. IBW: 135 lb %IBW: 98% UBW: *** lb %UBW: ***%   [] Weight Loss [] Weight Gain [] Weight Stable    Estimated Nutrition Needs: [x] MSJ  [] Other:  Calories: 1590 Kcal Based on:   [x] Actual BW    Protein:   60-72 g Based on:   [x] Actual BW    Fluid:       3371-7602 ml Based on:   [x] Actual BW      [x] No Cultural, Anabaptist or ethnic dietary need identified.     [] Cultural, Anabaptist and ethnic food preferences identified and addressed     Wt Status:  [x] Normal (18.6 - 24.9) [] Underweight (<18.5) [] Overweight (25 - 29.9) [] Mild Obesity (30 - 34.9)  [] Moderate Obesity (35 - 39.9) [] Morbid Obesity (40+)   [] Moderate Malnutrition [] Severe Malnutrition in the context of :     Nutrition Problems Identified:   [] Suboptimal PO intake   [] Food Allergies  [] Difficulty chewing/swallowing/poor dentition  [] Constipation/Diarrhea   [] Nausea/Vomiting   [] None  [] Other:     Plan:   [] Therapeutic Diet  []  Obtained/adjusted food preferences/tolerances and/or snacks options   []  Supplements added   [] Occupational therapy following for feeding techniques  []  HS snack added   []  Modify diet texture   []  Modify diet for food allergies   []  Educate patient   []  Assist with menu selection   []  Monitor PO intake on meal rounds   []  Continue inpatient monitoring and intervention   []  Participated in discharge planning/Interdisciplinary rounds/Team meetings   []  Other:     Education Needs:   [] Not appropriate for teaching at this time due to:   [] Identified and addressed    Nutrition Monitoring and Evaluation:  [] Continue ongoing monitoring and intervention  [] Jessi Frausto

## 2017-11-17 NOTE — PROGRESS NOTES
Reason for Renal Dosing:  Per Renal Dosing Policy    Patient clinical status and labs ordered/reviewed. Pt Weight Weight: 59.9 kg (132 lb)   Serum Creatinine Lab Results   Component Value Date/Time    Creatinine 1.62 11/16/2017 03:15 PM    Creatinine, POC 1.0 07/13/2012 10:19 PM       Creatinine Clearance Estimated Creatinine Clearance: 30.1 mL/min (based on Cr of 1.62). BUN Lab Results   Component Value Date/Time    BUN 39 11/16/2017 03:15 PM    BUN, POC 9 07/13/2012 10:19 PM       WBC Lab Results   Component Value Date/Time    WBC 6.4 11/16/2017 03:15 PM      Temperature Temp: 97.7 °F (36.5 °C)   HR Pulse (Heart Rate): 91     BP BP: 162/89           Drug type: Antibiotic indicated for Urinary Tract Infection     Drug/dose: Levofloxacin 500 mg every 24 hours was adjusted to Levofloxacin 500 mg once, then 250 mg every 24 hours. Continue to monitor.     Signed Italo Cummings PHARMD  Date 11/16/2017  Time 10:00 PM

## 2017-11-17 NOTE — H&P
[address]    Name:  Henok Forte  MR#:  [id number]  :  1945  Account #:  [de-identified]  Date of Adm:  2017      PRIMARY CARE PHYSICIAN: Brigette Gamboa MD    CHIEF COMPLAINT: Lower extremity weakness. HISTORY OF PRESENT ILLNESS: The patient is a 78-year-old  [facility] female with past medical history per chart significant  for hypertension, asthma, and AIDS, presents to the emergency  department of [facility] with complaints of bilateral lower  extremity weakness. Bilateral lower extremity weakness has been  ongoing for the last 2 days and associated was some tingling and  numbness causing the patient to be extremely deconditioned and  resulting in recent falls. The patient also stated that she has been very  unsteady in her gait and is currently ambulating with a cane. She also  endorsed a cough that is productive of whitish sputum, but otherwise  denied any fever, chills, nausea, vomiting, headache, dizziness, or  other complaints. CT of the head done in the emergency department  per the radiologist's read revealed no acute intracranial findings. The  patient was seen by the teleneurologist who recommended admission to  the hospital for further workup including MRI of the brain and others. Laboratory data was significant for acute kidney injury as BUN and  creatinine was elevated at 13 and 1.62 respectively; along with mildly   elevated CK at 294. The hospitalist service was consulted to admit this   patient to the hospital.    PAST MEDICAL HISTORY:  Per chart:  1. Hypertension. 2. Asthma. 3. AIDS. PAST SURGICAL HISTORY: Status post left knee surgery several  years ago per the patient. ALLERGIES: PENICILLIN. HOME MEDICATIONS  Per chart:  1. Darunavir 600 mg p.o. twice daily. 2. Abacavir/lamivudine 600/300 p.o. daily. 3. Remeron 45 mg p.o. at bedtime. 4. Ritonavir 100 mg p.o. twice daily.   5. Gabapentin 100 mg p.o. 3 times daily.  6. Ibuprofen 400 mg p.o. every 6 hours as needed for pain. 7. Extra Strength Tylenol 1000 mg p.o. every 6 hours as needed for pain. FAMILY HISTORY: Both parents are , as mother  from  complications of heart problems, but she does not know the  cause of death of her dad. SOCIAL HISTORY: She denies ethanol use, tobacco use, or illicit  drug use. She stated that she is , but has no children and is  currently unemployed and receives 9003 ybuy  payments. REVIEW OF SYSTEMS: Complete review of system was done with  the pertinent as stated in history of present illness. PHYSICAL EXAMINATION  VITAL SIGNS: Temperature 97.7, heart rate 91, respiratory rate 14,  blood pressure 162/87, oxygen saturation 100%. GENERAL: She was lying comfortable in bed, alert, awake, oriented  x3, communicative, in no appreciable distress. HEAD: Normocephalic, atraumatic. HEENT: Pupils were equal and reactive and sclerae were anicteric. Oropharynx with no lesions, erythema, or exudate, and extraocular  muscles were intact. NECK: Supple, with no jugular venous distention. No carotid bruit, no  lymphadenopathy. CARDIOVASCULAR: Normal S1, S2. Regular rate and rhythm. RESPIRATORY: Chest was clear to auscultation bilaterally with no  rhonchi, rales, or wheezing noted. ABDOMEN: Soft, nontender, nondistended, no palpable  organomegaly, bowel sounds noted. EXTREMITIES: No edema was noted and pulses were palpated  bilaterally. NEUROLOGIC: Cranial nerves 2 to 12 were intact with no obvious  focal motor or sensory deficits noted. SKIN: Warm and dry with no obvious skin lesions or discolorations  noted. LABORATORY DATA: Complete blood count revealed white blood cell  count 6.4, hemoglobin 13.1, hematocrit 39.1, and platelets 269. Basic  metabolic profile revealed sodium 136, potassium 3.9, chloride 101,  bicarbonate 28, BUN 39, creatinine 1.62, and glucose 108.  Calcium  was 8.9, total bilirubin was 0.9, total protein was 9.8. Albumin was 3.7,  ALT was 28, AST was 33, alkaline phosphatase 59. CK was 294, CK-  MB was 3.9. Troponin was less than 0.02. IMAGING DATA: CT of the head revealed no acute intracranial  abnormalities per the radiologist read. EKG revealed sinus tachycardia, rate 101, with possible left atrial  enlargement and left ventricular hypertrophy. Urinalysis revealed small leukocyte esterase, negative nitrites,  negative bacteria, negative blood. IMPRESSION  1. Acute kidney injury. 2. Physical deconditioning with recent fall. 3. Tingling and numbness of bilateral lower extremity. 4. Mildly elevated CPK. 5. Hypertension. 6. History of acquired immunodeficiency syndrome. 7. Probable urinary tract infection. 8. Other medical problems as stated in history of present illness. PLAN  1. Acute kidney injury. She has been started on intravenous hydration  with D5 0.45% normal saline at 100 mL per hour and we will be  repeating a basic metabolic profile in the morning. Nephrotoxic  medications will be avoided during this hospitalization. 2. Physical deconditioning with recent falls. Physical and Occupational  Therapy have been consulted to evaluate and treat and she has been  placed on fall precautions. 3. Tingling and numbness in bilateral lower extremities. TSH, vitamin  B12, and RPR has been ordered and we will be obtaining an MRI of the  brain without contrast for further evaluation as recommended by the  teleneurologist.  4. Mildly elevated CPK. This may be resulting from the recent falls and  she will be continued on intravenous hydration as stated above and we  will be repeating CPK in the morning. 5. Hypertension. She has been started on amlodipine 10 mg p.o. daily  and blood pressure will be monitored closely during this  hospitalization. 6. Probable urinary tract infection.  She does not have clear indication  of urinary tract infection, but she will be treated empirically with  Levaquin and we will be checking urine culture. 7. History of AIDS. CD4 count and HIV viral load has been ordered and  please consult Infectious Disease in the morning. 8. Deep venous thrombosis prophylaxis. Heparin 5000 units every 8  hours has been ordered for deep venous thrombosis prophylaxis. Further evaluation and treatment will be dictated by pending workup  and her overall clinical course.         DO RODOLFO Choudhary MP  D:  11/16/2017   21:38  T:  11/17/2017   02:01  Job #:  [id number]

## 2017-11-17 NOTE — ED NOTES
Attempted to ambulate in foy, patient very unsteady on her feet, maximum support provided by this nurse while walking. Vera Herrera NP notified.

## 2017-11-17 NOTE — ED NOTES
Verbal shift change report given to Arjun Waldrop RN (oncoming nurse) by Harjinder George RN (offgoing nurse). Report included the following information SBAR.

## 2017-11-17 NOTE — PROGRESS NOTES
Problem: Mobility Impaired (Adult and Pediatric)  Goal: *Acute Goals and Plan of Care (Insert Text)  Physical Therapy Goals  Initiated 11/17/2017 and to be accomplished within 7 day(s)  1. Patient will move from supine to sit and sit to supine , scoot up and down and roll side to side in bed with modified independence. 2.  Patient will transfer from bed to chair and chair to bed with modified independence using the least restrictive device. 3.  Patient will perform sit to stand with modified independence. 4.  Patient will ambulate with modified independence for 300 feet with the least restrictive device. 5.  Patient will ascend/descend 3 stairs with  handrail(s) with modified independence. Outcome: Progressing Towards Goal  physical Therapy EVALUATION    Patient: Kristin Roper (74 y.o. female)  Date: 11/17/2017  Primary Diagnosis: JOSÉ LUIS (acute kidney injury) [facility])  Physical deconditioning        Precautions:   Fall    PROBLEM LIST:  Patient presents with the following problems:   Bed Mobility, Transfers, Gait, Strength, Balance, Stairs and Precautions  ASSESSMENT :   Patient requires between moderate assistance  and supervision/set-up for bed mobility, transfers and ambulation. Patient initially wanted to use straight cane then agreed to try a rw with son present . She agreed she felt safer and more steady. Discussed with son and patient to use rolling walker for mobility and possibility to get a rollator for the seat. Also discussed with son and patient needing to have a family conversation  On possible need and use of alert system incase of falling . education on plan of care safety with walker, and exercises to do in be needs reinforcement. Patient will benefit from skilled intervention to address the above impairments.   Patients rehabilitation potential is considered to be Fair  Factors which may influence rehabilitation potential include:   []         None noted  []         Mental ability/status  [x]         Medical condition  []         Home/family situation and support systems  []         Safety awareness  []         Pain tolerance/management  []         Other:      PLAN :  Recommendations and Planned Interventions:  [x]           Bed Mobility Training             [x]    Neuromuscular Re-Education  [x]           Transfer Training                   []    Orthotic/Prosthetic Training  [x]           Gait Training                          []    Modalities  [x]           Therapeutic Exercises          []    Edema Management/Control  [x]           Therapeutic Activities            [x]    Patient and Family Training/Education  []           Other (comment):    Frequency/Duration: Patient will be followed by physical therapy 1-2 times per day/4-7 days per week to address goals. Discharge Recommendations: Home Health  Further Equipment Recommendations for Discharge: rolling walker already delivered needs to be fitted to correct size     SUBJECTIVE:   Patient stated .    OBJECTIVE DATA SUMMARY:     Past Medical History:   Diagnosis Date    AIDS ([facility] [address]     Asthma     Hypertension    History reviewed. No pertinent surgical history. Barriers to Learning/Limitations: yes;  physical  Compensate with: visual, verbal, tactile, kinesthetic cues/model    G CODES:Mobility [id number] Current  CK= 40-59%   Goal  CJ= 20-39%. The severity rating is based on the [employer] Balance Scale2+/5   [employer] Balance Scale2+/5  0: Pt performs 25% or less of standing activity (Max assist) CN, 100% impaired. 1: Pt supports self with upper extremities but requires therapist assistance. Pt performs 25-50% of effort (Mod assist) CM, 80% to <100% impaired. 1+: Pt supports self with upper extremities but requires therapist assistance. Pt performs >50% effort. (Min assist). CL, 60% to <80% impaired.   2: Pt supports self independently with both upper extremities (walker, crutches, parallel bars). CL, 60% to <80% impaired. 2+: Pt support self independently with 1 upper extremity (cane, crutch, 1 parallel bar). CK, 40% to <60% impaired. 3: Pt stands without upper extremity support for up to 30 seconds. CK, 40% to <60% impaired. 3+: Pt stands without upper extremity support for 30 seconds or greater. CJ, 20% to <40% impaired. 4: Pt independently moves and returns center of gravity 1-2 inches in one plane. CJ, 20% to <40% impaired. 4+: Pt independently moves and returns center of gravity 1-2 inches in multiple planes. CI, 1% to <20% impaired. 5: Pt independently moves and returns center of gravity in all planes greater than 2 inches. CH, 0% impaired. Eval Complexity: History: HIGH Complexity :3+ comorbidities / personal factors will impact the outcome/ POC Exam:MEDIUM Complexity : 3 Standardized tests and measures addressing body structure, function, activity limitation and / or participation in recreation  Presentation: MEDIUM Complexity : Evolving with changing characteristics  Clinical Decision Making:Medium Complexity [facility] Standing Balance Scale2+/5 Overall Complexity:MEDIUM    Prior Level of Function/Home Situation: using straight cane fatigued quickly fear of falling   Home Situation  Home Environment: Apartment  # Steps to Enter: 3  Rails to Enter: Yes  Hand Rails : Bilateral  One/Two Story Residence: One story  Living Alone: Yes  Support Systems: Family member(s)  Patient Expects to be Discharged to[de-identified] Apartment  Current DME Used/Available at Home: Cane, straight, Commode, bedside  Tub or Shower Type: Tub/Shower combination (washes at sink)  Critical Behavior:  Neurologic State: Alert  Orientation Level: Oriented X4  Cognition: Follows commands  Safety/Judgement: Fall prevention  Psychosocial  Patient Behaviors: Calm; Cooperative  Family  Behaviors: Calm; Cooperative;Supportive  Purposeful Interaction: Yes  Pt Identified Daily Priority: Clinical issues (comment)  Carol Process: Establish trust;Attend basic human needs;Create healing environment;Supportive expression  Caring Interventions: Reassure; Therapeutic modalities  Reassure: Informing; Therapeutic listening;Quiet presence;Caring rounds  Therapeutic Modalities: Humor; Intentional therapeutic touch  Skin Condition/Temp: Warm;Dry  Family  Behaviors: Calm; Cooperative;Supportive  Skin Integumentary  Skin Condition/Temp: Warm;Dry  Strength:    Strength: Generally decreased, functional (3+/5 both legs )  Range Of Motion:  AROM: Generally decreased, functional  Functional Mobility:  Bed Mobility:  Supine to Sit: Contact guard assistance;Stand-by asssistance; Additional time;Assist x1  Sit to Supine: Stand-by asssistance;Contact guard assistance; Additional time;Assist x1  Transfers:  Sit to Stand: Contact guard assistance;Stand-by asssistance; Additional time;Assist x1  Stand to Sit: Stand-by asssistance;Contact guard assistance; Additional time;Assist x1  Balance:   Sitting: Impaired  Sitting - Static: Good (unsupported)  Sitting - Dynamic: Fair (occasional)  Standing: Impaired  Standing - Static: Fair  Standing - Dynamic : Fair  Ambulation/Gait Training:  Distance (ft): 55 Feet (ft) (x2)  Assistive Device: Walker, rolling  Ambulation - Level of Assistance: Contact guard assistance;Stand-by asssistance; Additional time;Assist x1  Gait Description (WDL): Exceptions to WDL  Gait Abnormalities: Decreased step clearance;Shuffling gait; Path deviations  Base of Support: Center of gravity altered  Speed/Yasmeen: Delayed  Step Length: Left shortened;Right shortened  Interventions: Safety awareness training;Verbal cues; Visual/Demos  Therapeutic Exercises:   ankle pumps glut sets and quad sets to do in bed.    Pain:  Pre treatment pain level:0  Post treatment pain level:0  Pain Scale 1: Numeric (0 - 10)  Pain Intensity 1: 0  Activity Tolerance:   Fair   Please refer to the flowsheet for vital signs taken during this treatment. After treatment:   []         Patient left in no apparent distress sitting up in chair  [x]         Patient left in no apparent distress in bed  [x]         Call bell left within reach  [x]         Nursing notified  [x]         Caregiver present son   []         Bed alarm activated    COMMUNICATION/EDUCATION:   [x]         Fall prevention education was provided and the patient/caregiver indicated understanding. [x]         Patient/family have participated as able in goal setting and plan of care. [x]         Patient/family agree to work toward stated goals and plan of care. []         Patient understands intent and goals of therapy, but is neutral about his/her participation. []         Patient is unable to participate in goal setting and plan of care. Patient educated on the role of physical therapy during the acute stay  and the importance of mobility. VU. Needs reinforcement.      Thank you for this referral.  Marry Neff, PT   Time Calculation: 30 mins

## 2017-11-17 NOTE — ED NOTES
Pt c/o itching and redness to arm where Levaquin is infusing. Hives noted to extremity. Infusion immediately stopped. Hospitalist paged.

## 2017-11-18 ENCOUNTER — HOME HEALTH ADMISSION (OUTPATIENT)
Dept: HOME HEALTH SERVICES | Facility: HOME HEALTH | Age: 72
End: 2017-11-18
Payer: MEDICAID

## 2017-11-18 VITALS
BODY MASS INDEX: 20.72 KG/M2 | SYSTOLIC BLOOD PRESSURE: 119 MMHG | OXYGEN SATURATION: 100 % | HEART RATE: 84 BPM | TEMPERATURE: 97.4 F | WEIGHT: 132 LBS | DIASTOLIC BLOOD PRESSURE: 73 MMHG | RESPIRATION RATE: 18 BRPM | HEIGHT: 67 IN

## 2017-11-18 LAB
BASOPHILS # BLD AUTO: 0 X10E3/UL (ref 0–0.2)
BASOPHILS NFR BLD AUTO: 0 %
CD3+CD4+ CELLS # BLD: 588 /UL (ref 359–1519)
CD3+CD4+ CELLS NFR BLD: 29.4 % (ref 30.8–58.5)
EOSINOPHIL # BLD AUTO: 0.3 X10E3/UL (ref 0–0.4)
EOSINOPHIL NFR BLD AUTO: 4 %
ERYTHROCYTE [DISTWIDTH] IN BLOOD BY AUTOMATED COUNT: 15 % (ref 12.3–15.4)
HCT VFR BLD AUTO: 40.4 % (ref 34–46.6)
HGB BLD-MCNC: 13.1 G/DL (ref 11.1–15.9)
IMM GRANULOCYTES # BLD: 0 X10E3/UL (ref 0–0.1)
IMM GRANULOCYTES NFR BLD: 0 %
LYMPHOCYTES # BLD AUTO: 2 X10E3/UL (ref 0.7–3.1)
LYMPHOCYTES NFR BLD AUTO: 33 %
MCH RBC QN AUTO: 28.4 PG (ref 26.6–33)
MCHC RBC AUTO-ENTMCNC: 32.4 G/DL (ref 31.5–35.7)
MCV RBC AUTO: 87 FL (ref 79–97)
MONOCYTES # BLD AUTO: 0.6 X10E3/UL (ref 0.1–0.9)
MONOCYTES NFR BLD AUTO: 9 %
NEUTROPHILS # BLD AUTO: 3.3 X10E3/UL (ref 1.4–7)
NEUTROPHILS NFR BLD AUTO: 54 %
PLATELET # BLD AUTO: 229 X10E3/UL (ref 150–379)
RBC # BLD AUTO: 4.62 X10E6/UL (ref 3.77–5.28)
WBC # BLD AUTO: 6.2 X10E3/UL (ref 3.4–10.8)

## 2017-11-18 PROCEDURE — 97110 THERAPEUTIC EXERCISES: CPT

## 2017-11-18 PROCEDURE — 74011000258 HC RX REV CODE- 258: Performed by: HOSPITALIST

## 2017-11-18 PROCEDURE — 74011250636 HC RX REV CODE- 250/636: Performed by: HOSPITALIST

## 2017-11-18 PROCEDURE — 97116 GAIT TRAINING THERAPY: CPT

## 2017-11-18 PROCEDURE — 74011250637 HC RX REV CODE- 250/637: Performed by: HOSPITALIST

## 2017-11-18 RX ORDER — ASPIRIN 325 MG
325 TABLET ORAL DAILY
Qty: 30 TAB | Refills: 0 | Status: SHIPPED | OUTPATIENT
Start: 2017-11-19

## 2017-11-18 RX ORDER — ASPIRIN 325 MG
325 TABLET ORAL DAILY
Status: DISCONTINUED | OUTPATIENT
Start: 2017-11-19 | End: 2017-11-18 | Stop reason: HOSPADM

## 2017-11-18 RX ORDER — AMLODIPINE BESYLATE 10 MG/1
10 TABLET ORAL DAILY
Qty: 30 TAB | Refills: 0 | Status: SHIPPED | OUTPATIENT
Start: 2017-11-19

## 2017-11-18 RX ORDER — SULFAMETHOXAZOLE AND TRIMETHOPRIM 800; 160 MG/1; MG/1
1 TABLET ORAL 2 TIMES DAILY
Qty: 8 TAB | Refills: 0 | Status: SHIPPED | OUTPATIENT
Start: 2017-11-18

## 2017-11-18 RX ORDER — ATORVASTATIN CALCIUM 40 MG/1
40 TABLET, FILM COATED ORAL
Qty: 30 TAB | Refills: 0 | Status: SHIPPED | OUTPATIENT
Start: 2017-11-18

## 2017-11-18 RX ORDER — ATORVASTATIN CALCIUM 40 MG/1
40 TABLET, FILM COATED ORAL
Status: DISCONTINUED | OUTPATIENT
Start: 2017-11-18 | End: 2017-11-18 | Stop reason: HOSPADM

## 2017-11-18 RX ADMIN — GUAIFENESIN 600 MG: 600 TABLET, EXTENDED RELEASE ORAL at 09:15

## 2017-11-18 RX ADMIN — AMLODIPINE BESYLATE 10 MG: 5 TABLET ORAL at 09:15

## 2017-11-18 RX ADMIN — DEXTROSE MONOHYDRATE AND SODIUM CHLORIDE 100 ML/HR: 5; .45 INJECTION, SOLUTION INTRAVENOUS at 02:34

## 2017-11-18 RX ADMIN — HEPARIN SODIUM 5000 UNITS: 5000 INJECTION, SOLUTION INTRAVENOUS; SUBCUTANEOUS at 09:16

## 2017-11-18 NOTE — PROGRESS NOTES
0730 Report received from [facility]. Patient received in bed awake and alert x 4 and able to make all her needs known at this time. Patient is noted to be continent of bowel and bladder and is up with assistance noted to be unsteady on her feet, Patient is noted with muscle weakness, she uses a cane at home, and states she borrowed it. Patient came in from home s/p fall with zero injury. Patient was working with PT/OT today ambulating in hallway and was noted to be tolerating well. Patient is currently in bed with zero s/s of distress and zero c/o pain. Bedside and Verbal shift change report given to Magdalena Robbins (oncoming nurse) by Taras Morales RN (offgoing nurse).  Report included the following information SBAR, Procedure Summary, Intake/Output, MAR, Recent Results, Med Rec Status and Cardiac Rhythm S1, S2.

## 2017-11-18 NOTE — PROGRESS NOTES
Patient admitted early this AM by Dr Jessica Cary. Patient seen on afternoon rounds. She is trying to ambulate and is hopeful she can discharge soon. Overall she reports she is feeling better. Following.

## 2017-11-18 NOTE — ROUTINE PROCESS
Bedside shift change report given to Scot RN (oncoming nurse) by Poonam Alberto RN (offgoing nurse). Report included the following information SBAR, Kardex, Intake/Output, MAR and Recent Results.

## 2017-11-18 NOTE — DISCHARGE INSTRUCTIONS
Patient armband removed and shredded    DISCHARGE SUMMARY from Nurse    PATIENT INSTRUCTIONS:    After general anesthesia or intravenous sedation, for 24 hours or while taking prescription Narcotics:  · Limit your activities  · Do not drive and operate hazardous machinery  · Do not make important personal or business decisions  · Do  not drink alcoholic beverages  · If you have not urinated within 8 hours after discharge, please contact your surgeon on call. Report the following to your surgeon:  · Excessive pain, swelling, redness or odor of or around the surgical area  · Temperature over 100.5  · Nausea and vomiting lasting longer than 4 hours or if unable to take medications  · Any signs of decreased circulation or nerve impairment to extremity: change in color, persistent  numbness, tingling, coldness or increase pain  · Any questions    What to do at Home:  Recommended activity: Activity as tolerated    If you experience any of the following symptoms chest pain or shortness of breath, please follow up with PCP. *  Please give a list of your current medications to your Primary Care Provider. *  Please update this list whenever your medications are discontinued, doses are      changed, or new medications (including over-the-counter products) are added. *  Please carry medication information at all times in case of emergency situations. These are general instructions for a healthy lifestyle:    No smoking/ No tobacco products/ Avoid exposure to second hand smoke  Surgeon General's Warning:  Quitting smoking now greatly reduces serious risk to your health.     Obesity, smoking, and sedentary lifestyle greatly increases your risk for illness    A healthy diet, regular physical exercise & weight monitoring are important for maintaining a healthy lifestyle    You may be retaining fluid if you have a history of heart failure or if you experience any of the following symptoms:  Weight gain of 3 pounds or more overnight or 5 pounds in a week, increased swelling in our hands or feet or shortness of breath while lying flat in bed. Please call your doctor as soon as you notice any of these symptoms; do not wait until your next office visit. Recognize signs and symptoms of STROKE:    F-face looks uneven    A-arms unable to move or move unevenly    S-speech slurred or non-existent    T-time-call 911 as soon as signs and symptoms begin-DO NOT go       Back to bed or wait to see if you get better-TIME IS BRAIN. Warning Signs of HEART ATTACK     Call 911 if you have these symptoms:   Chest discomfort. Most heart attacks involve discomfort in the center of the chest that lasts more than a few minutes, or that goes away and comes back. It can feel like uncomfortable pressure, squeezing, fullness, or pain.  Discomfort in other areas of the upper body. Symptoms can include pain or discomfort in one or both arms, the back, neck, jaw, or stomach.  Shortness of breath with or without chest discomfort.  Other signs may include breaking out in a cold sweat, nausea, or lightheadedness. Don't wait more than five minutes to call 911 - MINUTES MATTER! Fast action can save your life. Calling 911 is almost always the fastest way to get lifesaving treatment. Emergency Medical Services staff can begin treatment when they arrive -- up to an hour sooner than if someone gets to the hospital by car. The discharge information has been reviewed with the patient. The patient verbalized understanding. Discharge medications reviewed with the patient and appropriate educational materials and side effects teaching were provided. ___________________________________________________________________________________________________________________________________     Numbness and Tingling: Care Instructions  Your Care Instructions    Many things can cause numbness or tingling. Swelling may put pressure on a nerve.  This could cause you to lose feeling or have a pins-and-needles sensation on part of your body. Nerves may be damaged from trauma, toxins, or diseases, such as diabetes or multiple sclerosis (MS). Sometimes, though, the cause is not clear. If there is no clear reason for your symptoms, and you are not having any other symptoms, your doctor may suggest watching and waiting for a while to see if the numbness or tingling goes away on its own. Your doctor may want you to have blood or nerve tests to find the cause of your symptoms. Follow-up care is a key part of your treatment and safety. Be sure to make and go to all appointments, and call your doctor if you are having problems. It's also a good idea to know your test results and keep a list of the medicines you take. How can you care for yourself at home? · If your doctor prescribes medicine, take it exactly as directed. Call your doctor if you think you are having a problem with your medicine. · If you have any swelling, put ice or a cold pack on the area for 10 to 20 minutes at a time. Put a thin cloth between the ice and your skin. When should you call for help? Call 911 anytime you think you may need emergency care. For example, call if:  ? · You have weakness, numbness, or tingling in both legs. ? · You lose bowel or bladder control. ? · You have symptoms of a stroke. These may include:  ¨ Sudden numbness, tingling, weakness, or loss of movement in your face, arm, or leg, especially on only one side of your body. ¨ Sudden vision changes. ¨ Sudden trouble speaking. ¨ Sudden confusion or trouble understanding simple statements. ¨ Sudden problems with walking or balance. ¨ A sudden, severe headache that is different from past headaches. ? Watch closely for changes in your health, and be sure to contact your doctor if you have any problems, or if:  ? · You do not get better as expected. Where can you learn more?   Go to http://amari-noy.info/. Enter D862 in the search box to learn more about \"Numbness and Tingling: Care Instructions. \"  Current as of: 2016  Content Version: 11.4  © 2019-2217 [employer]. Care instructions adapted under license by [employer] (which disclaims liability or warranty for this information). If you have questions about a medical condition or this instruction, always ask your healthcare professional. [address] any warranty or liability for your use of this information. [employer] Activation    Thank you for requesting access to [employer]. Please follow the instructions below to securely access and download your online medical record. [employer] allows you to send messages to your doctor, view your test results, renew your prescriptions, schedule appointments, and more. How Do I Sign Up? 1. In your internet browser, go to [email or web address]  2. Click on the First Time User? Click Here link in the Sign In box. You will be redirect to the New Member Sign Up page. 3. Enter your [employer] Access Code exactly as it appears below. You will not need to use this code after youve completed the sign-up process. If you do not sign up before the expiration date, you must request a new code. [employer] Access Code: [email or web address]  Expires: 2017  4:34 AM (This is the date your [employer] access code will )    4. Enter the last four digits of your Social Security Number (xxxx) and Date of Birth (mm/dd/yyyy) as indicated and click Submit. You will be taken to the next sign-up page. 5. Create a Ple[employer]ora ID. This will be your [employer]ora login ID and cannot be changed, so think of one that is secure and easy to remember. 6. Create a [employer]ora password. You can change your password at any time. 7. Enter your Password Reset Question and Answer. This can be used at a later time if you forget your password.    8. Enter your e-mail address. You will receive e-mail notification when new information is available in [address] 9. Click Sign Up. You can now view and download portions of your medical record. 10. Click the Download Summary menu link to download a portable copy of your medical information. Additional Information    If you have questions, please visit the Frequently Asked Questions section of the [employer] website at [email or web address]"t/. Remember, [employer] is NOT to be used for urgent needs. For medical emergencies, dial 911. FOLLOW UP WITH DR PERALTA BY CALLING IN THE MORNING FOR AN APPOINTMENT. IF YOUR SYMPTOMS WORSE, RETURN TO THE ER AT ONCE FOR RE-EVALUATION.

## 2017-11-18 NOTE — ROUTINE PROCESS
0730 Bedside, Verbal and Written shift change report given to malcolm valentin (oncoming nurse) by gume (offgoing nurse). Report included the following information SBAR, Kardex and MAR. Pt sitting up in bed, awake and alert, denies complaints, call bell in reach, bed locked in low position, denies pain, walker at bedside, patient instructed to call when needs to get up    0900 meds given, IV painful to touch, will remove and attempt new IV    1200 spoke to Dr Danita Bryan, notified that patient wants to know discharge plans and wondering if she can eat, cardiac diet ordered per verbal order    1515 I have reviewed discharge instructions with the patient. The patient verbalized understanding. IV removed without difficulty, patient to f/u with PCP in 1 week, no scripts given to patient, patient states she has enough of her meds at home.

## 2017-11-18 NOTE — PROGRESS NOTES
Problem: Mobility Impaired (Adult and Pediatric)  Goal: *Acute Goals and Plan of Care (Insert Text)  Physical Therapy Goals  Initiated 11/17/2017 and to be accomplished within 7 day(s)  1. Patient will move from supine to sit and sit to supine , scoot up and down and roll side to side in bed with modified independence. 2.  Patient will transfer from bed to chair and chair to bed with modified independence using the least restrictive device. 3.  Patient will perform sit to stand with modified independence. 4.  Patient will ambulate with modified independence for 300 feet with the least restrictive device. 5.  Patient will ascend/descend 3 stairs with  handrail(s) with modified independence. physical Therapy TREATMENT    Patient: Cassy Chu (83 y.o. female)  Date: 11/18/2017  Diagnosis: JOSÉ LUIS (acute kidney injury) [facility])  Physical deconditioning <principal problem not specified>       Precautions: Fall  Chart, physical therapy assessment, plan of care and goals were reviewed. ASSESSMENT:  Patient required SBA supine to sit. SBA/CGA sit to stand. CGA gait with rolling walker 80 feet x 2 with sit rest between. Patient c/o feeling winded during gait. SpO2 checked, 97% in room air and HR 80 bpm after gait. Will benefit from continued PT intervention to increase overall functional mobility independence and safety. EDUCATION: LE Exercises, push up from bed for safe transfers sit to stand, gait training with rolling walker  Progression toward goals:        Improving appropriately and progressing toward goals             PLAN:  Patient continues to benefit from skilled intervention to address the above impairments. Continue treatment per established plan of care. Discharge Recommendations:  Home Health  Further Equipment Recommendations for Discharge:  rolling walker adjusted to correct height     SUBJECTIVE:   Patient stated 'I'm doing alright.     OBJECTIVE DATA SUMMARY:   Critical Behavior:  Neurologic State: Alert  Orientation Level: Oriented X4  Cognition: Follows commands  Safety/Judgement: Awareness of environment, Fall prevention    Functional Mobility Training:  Bed Mobility:  Supine to Sit: Stand-by asssistance; Additional time  Sit to Supine: Stand-by asssistance; Additional time  Transfers:  Sit to Stand: Stand-by asssistance;Contact guard assistance; Additional time  Stand to Sit: Stand-by asssistance;Contact guard assistance; Additional time  Balance:  Sitting: Impaired  Sitting - Static: Good (unsupported)  Sitting - Dynamic: Fair (occasional)  Standing: Impaired  Standing - Static: Fair  Standing - Dynamic : Fair  Ambulation/Gait Training:  Distance (ft): 80 Feet (ft) (+ 80 feet, sit rest between)  Assistive Device: Walker, rolling  Ambulation - Level of Assistance: Contact guard assistance  Gait Abnormalities: Decreased step clearance;Shuffling gait  Base of Support: Center of gravity altered  Speed/Yasmeen: Pace decreased (<100 feet/min)  Step Length: Left shortened;Right shortened  Interventions: Safety awareness training;Verbal cues; Visual/Demos    Therapeutic Exercises:   Sitting edge of bed, AROM x 15 reps both LE:  Marching in place, Heel raise, Toe Raise, LAQ    Vital Signs  Temp: 97.4 °F (36.3 °C)     Pulse (Heart Rate): 84     BP: 119/73     Resp Rate: 18     O2 Sat (%): 100 %     Pain:  Pre treatment pain level:  0  Post treatment pain level: 0  Pain Scale 1: Numeric (0 - 10)  Pain Intensity 1: 0      Activity Tolerance:   Fair Minus, c/o feeling winded during gait. SpO2 checked, 97% in room air and HR 80 bpm after gait.      After treatment:   Patient left in no apparent distress in bed  Call bell left within reach  Nursing notified    Recommendations for nursing:  Written on communication board: May ambulate in hallway with assist of 1 and rolling walker  Verbally communicated to: Nurse Makayla Mays, PT   Time Calculation: 23 mins

## 2017-11-18 NOTE — PROGRESS NOTES
Problem: Falls - Risk of  Goal: *Absence of Falls  Document Gerardo Fall Risk and appropriate interventions in the flowsheet.    Outcome: Progressing Towards Goal  Fall Risk Interventions:  Mobility Interventions: Bed/chair exit alarm         Medication Interventions: Bed/chair exit alarm    Elimination Interventions: Call light in reach    History of Falls Interventions: Bed/chair exit alarm

## 2017-11-19 ENCOUNTER — HOME CARE VISIT (OUTPATIENT)
Dept: HOME HEALTH SERVICES | Facility: HOME HEALTH | Age: 72
End: 2017-11-19

## 2017-11-19 LAB
BACTERIA SPEC CULT: ABNORMAL
BACTERIA SPEC CULT: ABNORMAL
SERVICE CMNT-IMP: ABNORMAL

## 2017-11-19 NOTE — DISCHARGE SUMMARY
Discharge Summary    Patient: Renee Hernandez               Sex: female          DOA: 11/16/2017         YOB: 1945      Age:  71 y.o.        LOS:  LOS: 2 days                Admit Date: 11/16/2017    Discharge Date: 11/17/2017    Discharge Medications:     Discharge Medication List as of 11/18/2017  2:49 PM      START taking these medications    Details   aspirin (ASPIRIN) 325 mg tablet Take 1 Tab by mouth daily. , Print, Disp-30 Tab, R-0      amLODIPine (NORVASC) 10 mg tablet Take 1 Tab by mouth daily. , Print, Disp-30 Tab, R-0      atorvastatin (LIPITOR) 40 mg tablet Take 1 Tab by mouth nightly. , Print, Disp-30 Tab, R-0      trimethoprim-sulfamethoxazole (BACTRIM DS) 160-800 mg per tablet Take 1 Tab by mouth two (2) times a day., Print, Disp-8 Tab, R-0         CONTINUE these medications which have NOT CHANGED    Details   acetaminophen (TYLENOL EXTRA STRENGTH) 500 mg tablet Take 2 Tabs by mouth every six (6) hours as needed for Pain., Print, Disp-40 Tab, R-0      gabapentin (NEURONTIN) 100 mg capsule Take 1 Cap by mouth three (3) times daily. , Print, Disp-30 Cap, R-0      ritonavir (NORVIR) 100 mg tablet Take 100 mg by mouth two (2) times daily (with meals). Historical Med, 100 mg      mirtazapine (REMERON) 45 mg tablet Take 45 mg by mouth nightly. Historical Med, 45 mg      abacavir-lamiVUDine (EPZICOM) 600-300 mg tablet Take 1 Tab by mouth daily. Historical Med, 1 Tab      darunavir (PREZISTA) 600 mg tablet Take 600 mg by mouth two (2) times a day. Historical Med, 600 mg         STOP taking these medications       ibuprofen (MOTRIN) 400 mg tablet Comments:   Reason for Stopping:                Follow-up: PCP    Discharge Condition: Stable    Activity: Activity as tolerated    Diet: Resume previous diet    Labs:  Labs: Results:       Chemistry Recent Labs      11/17/17   0608  11/16/17   1515   GLU  86  108*   NA  137  136   K  3.8  3.9   CL  103  101   CO2  25  28   BUN  26*  39*   CREA  1.11  1.62* CA  9.4  8.9   AGAP  9  7   BUCR  23*  24*   AP  60  59   TP  9.7*  9.8*   ALB  3.7  3.7   GLOB  6.0*  6.1*   AGRAT  0.6*  0.6*      CBC w/Diff Recent Labs      17   0608  17   1515   WBC  5.5  6.2  6.4   RBC  4.72  4.49   HGB  13.6  13.1  13.1   HCT  41.1  40.4  39.1   PLT  218  229  219   GRANS  56  54  57   LYMPH  32  32   EOS  5  4  4      Cardiac Enzymes Recent Labs      17   0608  17   1515   CPK  252*  294*   CKND1   --   1.3      Coagulation No results for input(s): PTP, INR, APTT in the last 72 hours. No lab exists for component: INREXT    Lipid Panel No results found for: CHOL, CHOLPOCT, CHOLX, CHLST, CHOLV, 814368, HDL, LDL, LDLC, DLDLP, 245319, VLDLC, VLDL, TGLX, TRIGL, TRIGP, TGLPOCT, CHHD, CHHDX   BNP No results for input(s): BNPP in the last 72 hours. Liver Enzymes Recent Labs      17   0608   TP  9.7*   ALB  3.7   AP  60   SGOT  34      Thyroid Studies Lab Results   Component Value Date/Time    TSH 0.66 2017 03:15 PM          Imagin. No acute findings.     2.  Marked chronic microvascular ischemic change, potentially enough to cause a  vascular subcortical dementia.     3. Right lentiform nucleus chronic lacunar infarct. Consults: None    Treatment Team: Treatment Team: Consulting Provider: Jeremy Tan MD; Hospitalist: Zita Allen DO; Occupational Therapist: Latasha Banerjee OT; Care Manager: Paty Fisher RN    Significant Diagnostic Studies: labs: No results found for this or any previous visit (from the past 25 hour(s)). Discharge diagnoses:    Problem List as of 2017  Never Reviewed          Codes Class Noted - Resolved    Physical deconditioning ICD-10-CM: R53.81  ICD-9-CM: 799.3  2017 - Present        JOSÉ LUIS (acute kidney injury) [facility]) ICD-10-CM: N17.9  ICD-9-CM: 584.9  2017 - Present            Hospital Course:   Major issues addressed during hospitalization outlined  below.      The patient is a 71-year-old  [facility] [employer] female with past medical history per chart significant  for hypertension, asthma, and AIDS, presents to the emergency  department of [facility] [place] with complaints of bilateral lower  extremity weakness. Bilateral lower extremity weakness has been  ongoing for the last 2 days and associated was some tingling and  numbness causing the patient to be extremely deconditioned and  resulting in recent falls. The patient also stated that she has been very  unsteady in her gait and is currently ambulating with a cane. She also  endorsed a cough that is productive of whitish sputum, but otherwise  denied any fever, chills, nausea, vomiting, headache, dizziness, or  other complaints. CT of the head done in the emergency department  per the radiologist's read revealed no acute intracranial findings. The  patient was seen by the teleneurologist who recommended admission to  the hospital for further workup including MRI of the brain and others. Laboratory data was significant for acute kidney injury as BUN and  creatinine was elevated at 13 and 1.62 respectively; along with mildly   elevated CK at 294. The hospitalist service was consulted to admit this   patient to the hospital.    Patient was seen by teleneuro for leg parathesia. MRI completed demonstrated chronic infarct. Patient was placed on appropriate ASA and statin. UTI and JOSÉ LUIS treated with fluid and abx that was changed to PO bactrim on DC the following day. Patient was discharged home next day with PCP f/u.      Neil Smith MD  November 19, 2017        Total time spent 35 minutes

## 2017-11-20 ENCOUNTER — HOME CARE VISIT (OUTPATIENT)
Dept: SCHEDULING | Facility: HOME HEALTH | Age: 72
End: 2017-11-20
Payer: MEDICAID

## 2017-11-20 VITALS
SYSTOLIC BLOOD PRESSURE: 112 MMHG | DIASTOLIC BLOOD PRESSURE: 70 MMHG | OXYGEN SATURATION: 96 % | TEMPERATURE: 97 F | RESPIRATION RATE: 18 BRPM | HEART RATE: 95 BPM

## 2017-11-20 LAB
HIV1 RNA # SERPL NAA+PROBE: <20 COPIES/ML
HIV1 RNA SERPL NAA+PROBE-LOG#: NORMAL LOG10COPY/ML

## 2017-11-20 PROCEDURE — 400013 HH SOC

## 2017-11-20 PROCEDURE — G0299 HHS/HOSPICE OF RN EA 15 MIN: HCPCS

## 2017-11-21 ENCOUNTER — HOME CARE VISIT (OUTPATIENT)
Dept: HOME HEALTH SERVICES | Facility: HOME HEALTH | Age: 72
End: 2017-11-21
Payer: MEDICAID

## 2017-11-21 VITALS
DIASTOLIC BLOOD PRESSURE: 76 MMHG | HEART RATE: 96 BPM | SYSTOLIC BLOOD PRESSURE: 126 MMHG | OXYGEN SATURATION: 91 % | TEMPERATURE: 96.5 F

## 2017-11-21 PROCEDURE — G0151 HHCP-SERV OF PT,EA 15 MIN: HCPCS

## 2017-11-22 ENCOUNTER — HOME CARE VISIT (OUTPATIENT)
Dept: HOME HEALTH SERVICES | Facility: HOME HEALTH | Age: 72
End: 2017-11-22
Payer: MEDICAID

## 2017-11-22 ENCOUNTER — HOME CARE VISIT (OUTPATIENT)
Dept: SCHEDULING | Facility: HOME HEALTH | Age: 72
End: 2017-11-22
Payer: MEDICAID

## 2017-11-22 VITALS
RESPIRATION RATE: 18 BRPM | SYSTOLIC BLOOD PRESSURE: 120 MMHG | DIASTOLIC BLOOD PRESSURE: 70 MMHG | TEMPERATURE: 98.4 F | HEART RATE: 96 BPM | OXYGEN SATURATION: 96 %

## 2017-11-22 PROCEDURE — G0496 LPN CARE TRAIN/EDU IN HH: HCPCS

## 2017-11-24 ENCOUNTER — HOME CARE VISIT (OUTPATIENT)
Dept: SCHEDULING | Facility: HOME HEALTH | Age: 72
End: 2017-11-24
Payer: MEDICAID

## 2017-11-24 VITALS — TEMPERATURE: 97.6 F | HEART RATE: 96 BPM | OXYGEN SATURATION: 95 %

## 2017-11-24 PROCEDURE — G0151 HHCP-SERV OF PT,EA 15 MIN: HCPCS

## 2017-11-28 ENCOUNTER — HOME CARE VISIT (OUTPATIENT)
Dept: SCHEDULING | Facility: HOME HEALTH | Age: 72
End: 2017-11-28
Payer: MEDICAID

## 2017-11-28 VITALS
HEART RATE: 92 BPM | DIASTOLIC BLOOD PRESSURE: 70 MMHG | TEMPERATURE: 98 F | RESPIRATION RATE: 18 BRPM | OXYGEN SATURATION: 98 % | SYSTOLIC BLOOD PRESSURE: 102 MMHG

## 2017-11-28 VITALS
OXYGEN SATURATION: 98 % | TEMPERATURE: 98.9 F | HEART RATE: 87 BPM | SYSTOLIC BLOOD PRESSURE: 108 MMHG | DIASTOLIC BLOOD PRESSURE: 68 MMHG

## 2017-11-28 VITALS
SYSTOLIC BLOOD PRESSURE: 108 MMHG | HEART RATE: 87 BPM | DIASTOLIC BLOOD PRESSURE: 68 MMHG | TEMPERATURE: 98.9 F | OXYGEN SATURATION: 98 %

## 2017-11-28 PROCEDURE — G0496 LPN CARE TRAIN/EDU IN HH: HCPCS

## 2017-11-28 PROCEDURE — G0157 HHC PT ASSISTANT EA 15: HCPCS

## 2017-11-28 PROCEDURE — G0152 HHCP-SERV OF OT,EA 15 MIN: HCPCS

## 2017-11-30 ENCOUNTER — HOME CARE VISIT (OUTPATIENT)
Dept: SCHEDULING | Facility: HOME HEALTH | Age: 72
End: 2017-11-30
Payer: MEDICAID

## 2017-11-30 VITALS
SYSTOLIC BLOOD PRESSURE: 115 MMHG | TEMPERATURE: 97.9 F | HEART RATE: 92 BPM | RESPIRATION RATE: 16 BRPM | OXYGEN SATURATION: 97 % | DIASTOLIC BLOOD PRESSURE: 66 MMHG

## 2017-11-30 VITALS
HEART RATE: 98 BPM | DIASTOLIC BLOOD PRESSURE: 63 MMHG | SYSTOLIC BLOOD PRESSURE: 118 MMHG | TEMPERATURE: 98.2 F | OXYGEN SATURATION: 95 %

## 2017-11-30 PROCEDURE — G0496 LPN CARE TRAIN/EDU IN HH: HCPCS

## 2017-11-30 PROCEDURE — G0157 HHC PT ASSISTANT EA 15: HCPCS

## 2017-12-01 ENCOUNTER — HOME CARE VISIT (OUTPATIENT)
Dept: SCHEDULING | Facility: HOME HEALTH | Age: 72
End: 2017-12-01
Payer: MEDICAID

## 2017-12-01 VITALS
OXYGEN SATURATION: 97 % | HEART RATE: 105 BPM | TEMPERATURE: 98.3 F | SYSTOLIC BLOOD PRESSURE: 122 MMHG | DIASTOLIC BLOOD PRESSURE: 63 MMHG

## 2017-12-01 PROCEDURE — G0157 HHC PT ASSISTANT EA 15: HCPCS

## 2017-12-05 ENCOUNTER — HOME CARE VISIT (OUTPATIENT)
Dept: SCHEDULING | Facility: HOME HEALTH | Age: 72
End: 2017-12-05
Payer: MEDICAID

## 2017-12-05 VITALS
OXYGEN SATURATION: 98 % | SYSTOLIC BLOOD PRESSURE: 106 MMHG | RESPIRATION RATE: 18 BRPM | TEMPERATURE: 97.4 F | HEART RATE: 94 BPM | DIASTOLIC BLOOD PRESSURE: 60 MMHG

## 2017-12-05 VITALS
TEMPERATURE: 98.7 F | HEART RATE: 98 BPM | OXYGEN SATURATION: 98 % | DIASTOLIC BLOOD PRESSURE: 63 MMHG | SYSTOLIC BLOOD PRESSURE: 124 MMHG

## 2017-12-05 PROCEDURE — G0494 LPN CARE EA 15MIN HH/HOSPICE: HCPCS

## 2017-12-05 PROCEDURE — G0152 HHCP-SERV OF OT,EA 15 MIN: HCPCS

## 2017-12-05 PROCEDURE — G0157 HHC PT ASSISTANT EA 15: HCPCS

## 2017-12-06 ENCOUNTER — HOME CARE VISIT (OUTPATIENT)
Dept: SCHEDULING | Facility: HOME HEALTH | Age: 72
End: 2017-12-06
Payer: MEDICAID

## 2017-12-06 VITALS
OXYGEN SATURATION: 98 % | DIASTOLIC BLOOD PRESSURE: 62 MMHG | HEART RATE: 98 BPM | SYSTOLIC BLOOD PRESSURE: 124 MMHG | TEMPERATURE: 98.7 F

## 2017-12-06 VITALS
DIASTOLIC BLOOD PRESSURE: 74 MMHG | HEART RATE: 88 BPM | OXYGEN SATURATION: 96 % | SYSTOLIC BLOOD PRESSURE: 128 MMHG | TEMPERATURE: 98.6 F

## 2017-12-06 PROCEDURE — G0152 HHCP-SERV OF OT,EA 15 MIN: HCPCS

## 2017-12-07 ENCOUNTER — HOME CARE VISIT (OUTPATIENT)
Dept: SCHEDULING | Facility: HOME HEALTH | Age: 72
End: 2017-12-07
Payer: MEDICAID

## 2017-12-07 VITALS
DIASTOLIC BLOOD PRESSURE: 62 MMHG | OXYGEN SATURATION: 97 % | SYSTOLIC BLOOD PRESSURE: 119 MMHG | TEMPERATURE: 98.5 F | HEART RATE: 91 BPM

## 2017-12-07 PROCEDURE — G0157 HHC PT ASSISTANT EA 15: HCPCS

## 2017-12-08 ENCOUNTER — HOME CARE VISIT (OUTPATIENT)
Dept: HOME HEALTH SERVICES | Facility: HOME HEALTH | Age: 72
End: 2017-12-08
Payer: MEDICAID

## 2017-12-08 ENCOUNTER — HOME CARE VISIT (OUTPATIENT)
Dept: SCHEDULING | Facility: HOME HEALTH | Age: 72
End: 2017-12-08
Payer: MEDICAID

## 2017-12-08 VITALS
SYSTOLIC BLOOD PRESSURE: 131 MMHG | HEART RATE: 87 BPM | DIASTOLIC BLOOD PRESSURE: 62 MMHG | OXYGEN SATURATION: 97 % | TEMPERATURE: 98.2 F

## 2017-12-08 PROCEDURE — G0157 HHC PT ASSISTANT EA 15: HCPCS

## 2017-12-11 ENCOUNTER — HOME CARE VISIT (OUTPATIENT)
Dept: SCHEDULING | Facility: HOME HEALTH | Age: 72
End: 2017-12-11
Payer: MEDICAID

## 2017-12-11 VITALS
SYSTOLIC BLOOD PRESSURE: 128 MMHG | OXYGEN SATURATION: 97 % | HEART RATE: 108 BPM | DIASTOLIC BLOOD PRESSURE: 70 MMHG | TEMPERATURE: 99.6 F

## 2017-12-11 PROCEDURE — G0152 HHCP-SERV OF OT,EA 15 MIN: HCPCS

## 2018-04-28 ENCOUNTER — HOSPITAL ENCOUNTER (EMERGENCY)
Age: 73
Discharge: HOME | End: 2018-04-28
Attending: EMERGENCY MEDICINE
Payer: MEDICAID

## 2018-04-28 VITALS
HEART RATE: 96 BPM | DIASTOLIC BLOOD PRESSURE: 78 MMHG | SYSTOLIC BLOOD PRESSURE: 126 MMHG | RESPIRATION RATE: 18 BRPM | TEMPERATURE: 98.2 F | OXYGEN SATURATION: 100 %

## 2018-04-28 DIAGNOSIS — B20 HISTORY OF HIV INFECTION (HCC): ICD-10-CM

## 2018-04-28 DIAGNOSIS — R25.2 SPASMS OF THE HANDS OR FEET: Primary | ICD-10-CM

## 2018-04-28 LAB
ANION GAP SERPL CALC-SCNC: 10 MMOL/L (ref 3–18)
BASOPHILS # BLD: 0 K/UL (ref 0–0.06)
BASOPHILS NFR BLD: 1 % (ref 0–2)
BUN SERPL-MCNC: 30 MG/DL (ref 7–18)
BUN/CREAT SERPL: 21 (ref 12–20)
CALCIUM SERPL-MCNC: 9.5 MG/DL (ref 8.5–10.1)
CHLORIDE SERPL-SCNC: 100 MMOL/L (ref 100–108)
CO2 SERPL-SCNC: 27 MMOL/L (ref 21–32)
CREAT SERPL-MCNC: 1.43 MG/DL (ref 0.6–1.3)
DIFFERENTIAL METHOD BLD: ABNORMAL
EOSINOPHIL # BLD: 0.4 K/UL (ref 0–0.4)
EOSINOPHIL NFR BLD: 7 % (ref 0–5)
ERYTHROCYTE [DISTWIDTH] IN BLOOD BY AUTOMATED COUNT: 13.4 % (ref 11.6–14.5)
GLUCOSE SERPL-MCNC: 104 MG/DL (ref 74–99)
HCT VFR BLD AUTO: 43.2 % (ref 35–45)
HGB BLD-MCNC: 14.5 G/DL (ref 12–16)
LYMPHOCYTES # BLD: 1.9 K/UL (ref 0.9–3.6)
LYMPHOCYTES NFR BLD: 32 % (ref 21–52)
MAGNESIUM SERPL-MCNC: 2.2 MG/DL (ref 1.6–2.6)
MCH RBC QN AUTO: 28.4 PG (ref 24–34)
MCHC RBC AUTO-ENTMCNC: 33.6 G/DL (ref 31–37)
MCV RBC AUTO: 84.5 FL (ref 74–97)
MONOCYTES # BLD: 0.5 K/UL (ref 0.05–1.2)
MONOCYTES NFR BLD: 8 % (ref 3–10)
NEUTS SEG # BLD: 3 K/UL (ref 1.8–8)
NEUTS SEG NFR BLD: 52 % (ref 40–73)
PHOSPHATE SERPL-MCNC: 4.1 MG/DL (ref 2.5–4.9)
PLATELET # BLD AUTO: 219 K/UL (ref 135–420)
PMV BLD AUTO: 12.6 FL (ref 9.2–11.8)
POTASSIUM SERPL-SCNC: 3.6 MMOL/L (ref 3.5–5.5)
RBC # BLD AUTO: 5.11 M/UL (ref 4.2–5.3)
SODIUM SERPL-SCNC: 137 MMOL/L (ref 136–145)
WBC # BLD AUTO: 5.8 K/UL (ref 4.6–13.2)

## 2018-04-28 PROCEDURE — 85025 COMPLETE CBC W/AUTO DIFF WBC: CPT | Performed by: EMERGENCY MEDICINE

## 2018-04-28 PROCEDURE — 99283 EMERGENCY DEPT VISIT LOW MDM: CPT

## 2018-04-28 PROCEDURE — 80048 BASIC METABOLIC PNL TOTAL CA: CPT | Performed by: EMERGENCY MEDICINE

## 2018-04-28 PROCEDURE — 84100 ASSAY OF PHOSPHORUS: CPT | Performed by: EMERGENCY MEDICINE

## 2018-04-28 PROCEDURE — 83735 ASSAY OF MAGNESIUM: CPT | Performed by: EMERGENCY MEDICINE

## 2018-04-29 NOTE — ED PROVIDER NOTES
[facility] EMERGENCY DEPT      11:10 PM    Date: 4/28/2018  Patient Name: Josefa Muñoz    History of Presenting Illness     Chief Complaint   Patient presents with    Numbness       History Provided By: Patient    Chief Complaint: Left hand numbness  Duration:  Minutes  Timing:  Resolved  Location: Left hand  Quality: numbness  Severity: Moderate  Modifying Factors: None  Associated Symptoms: Left hand cramping    71 y.o. female with noted past medical history who presents to the emergency department for evaluation of left hand numbness that occurred PTA. She report sthat she had just taken her Anti-retrovirals when her hand cramped into a fist and became numb. This episode last ~5 minutes and resolved. She has had similar episodes in the past. She does not take her anti-retrovirals every day as they make her nauseated. She denies trauma, HA, and any further complaints. Nursing notes regarding the HPI and triage nursing notes were reviewed. Prior medical records were reviewed. Current Outpatient Prescriptions   Medication Sig Dispense Refill    hydroCHLOROthiazide (HYDRODIURIL) 25 mg tablet Take 25 mg by mouth daily.  triamcinolone acetonide (KENALOG) 0.1 % ointment Apply 1 Dose to affected area two (2) times a day.  mirtazapine (REMERON) 15 mg tablet Take 15 mg by mouth nightly.  LORazepam (ATIVAN) 1 mg tablet Take 1 mg by mouth nightly.  mirtazapine (REMERON) 15 mg tablet Take 15 mg by mouth nightly.  trifluoperazine (STELAZINE) 5 mg tablet Take 5 mg by mouth nightly.  abacavir-lamiVUDine (EPZICOM) 600-300 mg tablet Take 1 Tab by mouth daily.  OLANZapine (ZYPREXA) 20 mg tablet Take 20 mg by mouth nightly.  lisinopril (PRINIVIL, ZESTRIL) 20 mg tablet Take 20 mg by mouth daily.  aspirin (ASPIRIN) 325 mg tablet Take 1 Tab by mouth daily. 30 Tab 0    amLODIPine (NORVASC) 10 mg tablet Take 1 Tab by mouth daily.  30 Tab 0    atorvastatin (LIPITOR) 40 mg tablet Take 1 Tab by mouth nightly. 30 Tab 0    trimethoprim-sulfamethoxazole (BACTRIM DS) 160-800 mg per tablet Take 1 Tab by mouth two (2) times a day. 8 Tab 0    acetaminophen (TYLENOL EXTRA STRENGTH) 500 mg tablet Take 2 Tabs by mouth every six (6) hours as needed for Pain. 40 Tab 0    gabapentin (NEURONTIN) 100 mg capsule Take 1 Cap by mouth three (3) times daily. 30 Cap 0    ritonavir (NORVIR) 100 mg tablet Take 100 mg by mouth two (2) times daily (with meals).  mirtazapine (REMERON) 45 mg tablet Take 45 mg by mouth nightly.  abacavir-lamiVUDine (EPZICOM) 600-300 mg tablet Take 1 Tab by mouth daily.  darunavir (PREZISTA) 600 mg tablet Take 600 mg by mouth two (2) times a day. Past History     Past Medical History:  Past Medical History:   Diagnosis Date    AIDS ([address]     Asthma     Hypertension        Past Surgical History:  History reviewed. No pertinent surgical history. Family History:  History reviewed. No pertinent family history. Social History:  Social History   Substance Use Topics    Smoking status: Never Smoker    Smokeless tobacco: Never Used    Alcohol use No       Allergies: Allergies   Allergen Reactions    Levaquin [Levofloxacin] Hives    Pcn [Penicillins] Hives       Patient's primary care provider (as noted in EPIC):  Yoly Martinez MD    Review of Systems   Constitutional: Negative for diaphoresis. HENT: Negative for congestion. Eyes: Negative for discharge. Respiratory: Negative for stridor. Cardiovascular: Negative for palpitations. Gastrointestinal: Negative for diarrhea. Genitourinary: Negative for flank pain. Musculoskeletal: Negative for back pain. Positive for left hand cramping   Neurological: Positive for numbness (left hand). Negative for weakness. Psychiatric/Behavioral: Negative for hallucinations. All other systems reviewed and are negative.       Visit Vitals    /78    Pulse 96    Temp 98.2 °F (36.8 °C)    Resp 18    SpO2 100%       PHYSICAL EXAM:    CONSTITUTIONAL:  Alert, in no apparent distress;  well developed;  well nourished. HEAD:  Normocephalic, atraumatic. EYES:  EOMI. Non-icteric sclera. Normal conjunctiva. ENTM:  Nose:  no rhinorrhea. Throat:  no erythema or exudate, mucous membranes moist.  NECK:  No JVD. Supple  RESPIRATORY:  Chest clear, equal breath sounds, good air movement. CARDIOVASCULAR:  Regular rate and rhythm. No murmurs, rubs, or gallops. GI:  Normal bowel sounds, abdomen soft and non-tender. No rebound or guarding. BACK:  Non-tender. UPPER EXT:  Normal inspection. LOWER EXT:  No edema, no calf tenderness. Distal pulses intact. NEURO:  Moves all four extremities. Normal motor exam and sensation in all four extremities. Normal CN II-XII exam.  Normal bilateral finger-to-nose exam.     SKIN:  No rashes;  Normal for age. PSYCH:  Alert and normal affect. DIFFERENTIAL DIAGNOSES/ MEDICAL DECISION MAKING:  Neuropathy pain to include nerve impingement, electrolyte or hormonal imbalance, other etiologies, versus a combination of the above. In some patients, also consider CVA, TIA, intracerebral hemorrhage, subdural hemorrhage.     ED COURSE:      Diagnostic Study Results     Abnormal lab results from this emergency department encounter:  Labs Reviewed   CBC WITH AUTOMATED DIFF - Abnormal; Notable for the following:        Result Value    MPV 12.6 (*)     EOSINOPHILS 7 (*)     All other components within normal limits   METABOLIC PANEL, BASIC - Abnormal; Notable for the following:     Glucose 104 (*)     BUN 30 (*)     Creatinine 1.43 (*)     BUN/Creatinine ratio 21 (*)     GFR est AA 44 (*)     GFR est non-AA 36 (*)     All other components within normal limits   MAGNESIUM   PHOSPHORUS       Lab values for this patient within approximately the last 12 hours:  Recent Results (from the past 12 hour(s))   CBC WITH AUTOMATED DIFF    Collection Time: 04/28/18 10:00 PM Result Value Ref Range    WBC 5.8 4.6 - 13.2 K/uL    RBC 5.11 4.20 - 5.30 M/uL    HGB 14.5 12.0 - 16.0 g/dL    HCT 43.2 35.0 - 45.0 %    MCV 84.5 74.0 - 97.0 FL    MCH 28.4 24.0 - 34.0 PG    MCHC 33.6 31.0 - 37.0 g/dL    RDW 13.4 11.6 - 14.5 %    PLATELET 253 000 - 906 K/uL    MPV 12.6 (H) 9.2 - 11.8 FL    NEUTROPHILS 52 40 - 73 %    LYMPHOCYTES 32 21 - 52 %    MONOCYTES 8 3 - 10 %    EOSINOPHILS 7 (H) 0 - 5 %    BASOPHILS 1 0 - 2 %    ABS. NEUTROPHILS 3.0 1.8 - 8.0 K/UL    ABS. LYMPHOCYTES 1.9 0.9 - 3.6 K/UL    ABS. MONOCYTES 0.5 0.05 - 1.2 K/UL    ABS. EOSINOPHILS 0.4 0.0 - 0.4 K/UL    ABS. BASOPHILS 0.0 0.0 - 0.06 K/UL    DF AUTOMATED     METABOLIC PANEL, BASIC    Collection Time: 04/28/18 10:00 PM   Result Value Ref Range    Sodium 137 136 - 145 mmol/L    Potassium 3.6 3.5 - 5.5 mmol/L    Chloride 100 100 - 108 mmol/L    CO2 27 21 - 32 mmol/L    Anion gap 10 3.0 - 18 mmol/L    Glucose 104 (H) 74 - 99 mg/dL    BUN 30 (H) 7.0 - 18 MG/DL    Creatinine 1.43 (H) 0.6 - 1.3 MG/DL    BUN/Creatinine ratio 21 (H) 12 - 20      GFR est AA 44 (L) >60 ml/min/1.73m2    GFR est non-AA 36 (L) >60 ml/min/1.73m2    Calcium 9.5 8.5 - 10.1 MG/DL   MAGNESIUM    Collection Time: 04/28/18 10:00 PM   Result Value Ref Range    Magnesium 2.2 1.6 - 2.6 mg/dL   PHOSPHORUS    Collection Time: 04/28/18 10:00 PM   Result Value Ref Range    Phosphorus 4.1 2.5 - 4.9 MG/DL       Radiologist and cardiologist interpretations if available at time of this note:  No results found. Medication(s) ordered for patient during this emergency visit encounter:  Medications - No data to display    Medical Decision Making     I am the first provider for this patient. I reviewed the vital signs, available nursing notes, past medical history, past surgical history, family history and social history. Vital Signs:  Reviewed the patient's vital signs. DIAGNOSIS:  1. Left hand spasm, resolved.      SPECIFIC PATIENT INSTRUCTIONS FROM THE PHYSICIAN WHO TREATED YOU IN THE ER TODAY:  1. Return if any concerns or worsening of condition(s). 2. FOLLOW UP APPOINTMENT:  Your primary doctor in 1-2 days. Patient is improved, resting quietly and comfortably. The patient will be discharged home. The patient was reassured that these symptoms do not appear to represent a serious or life threatening condition at this time. Warning signs of worsening condition were discussed and understood by the patient. Based on patient's age, coexisting illness, exam, and the results of this ED evaluation, the decision to treat as an outpatient was made. Based on the information available at time of discharge, acute pathology requiring immediate intervention was deemed relative unlikely. While it is impossible to completely exclude the possibility of underlying serious disease or worsening of condition, I feel the relative likelihood is extremely low. I discussed this uncertainty with the patient, who understood ED evaluation and treatment and felt comfortable with the outpatient treatment plan. All questions regarding care, test results, and follow up were answered. The patient is stable and appropriate to discharge. They understand that they should return to the emergency department for any new or worsening symptoms. I stressed the importance of follow up for repeat assessment and possibly further evaluation/treatment. Coding Diagnoses     Clinical Impression:   1. Spasms of the hands or feet    2. History of HIV infection        Disposition     Disposition:  Home. Renato Quinn M.D.   [facility] Board Certified Emergency Physician    Provider Attestation:  If a scribe was utilized in generation of this patient record, I personally performed the services described in the documentation, reviewed the documentation, as recorded by the scribe in my presence, and it accurately records the patient's history of presenting illness, review of systems, patient physical examination, and procedures performed by me as the attending physician. Reg Talavera M.D.   [facility] Board Certified Emergency Physician  4/28/2018.  11:11 PM

## 2018-04-29 NOTE — DISCHARGE INSTRUCTIONS
SPECIFIC PATIENT INSTRUCTIONS FROM THE PHYSICIAN WHO TREATED YOU IN THE ER TODAY:  1. Return if any concerns or worsening of condition(s). 2. FOLLOW UP APPOINTMENT:  Your primary doctor in 1-2 days. Muscle Cramps: Care Instructions  Your Care Instructions    A muscle cramp occurs when a muscle tightens up suddenly. A cramp often happens in the legs. A muscle cramp is also called a muscle spasm or a charley horse. Muscle cramps usually last less than a minute. However, the pain may last for several minutes. Leg cramps that occur at night may wake you up. Heavy exercise, dehydration, and being overweight can increase your risk of getting cramps. An imbalance of certain chemicals in your blood, called electrolytes, can also lead to muscle cramps. Pregnant women sometimes get muscle cramps during sleep. Muscle cramps can be treated by stretching and massaging the muscle. If cramps keep coming back, your doctor may prescribe medicine that relaxes your muscles. Follow-up care is a key part of your treatment and safety. Be sure to make and go to all appointments, and call your doctor if you are having problems. It's also a good idea to know your test results and keep a list of the medicines you take. How can you care for yourself at home? · Drink plenty of fluids to prevent dehydration. Choose water and other caffeine-free clear liquids until you feel better. If you have kidney, heart, or liver disease and have to limit fluids, talk with your doctor before you increase the amount of fluids you drink. · Stretch your muscles every day, especially before and after exercise and at bedtime. Regular stretching can relax your muscles and may prevent cramps. · Do not suddenly increase the amount of exercise you get. Increase your exercise a little each week. · When you get a cramp, stretch and massage the muscle. You can also take a warm shower or bath to relax the muscle.  A heating pad placed on the muscle can also help. · Take a daily multivitamin supplement. · Ask your doctor if you can take an over-the-counter pain medicine, such as acetaminophen (Tylenol), ibuprofen (Advil, Motrin), or naproxen (Aleve). Be safe with medicines. Read and follow all instructions on the label. When should you call for help? Watch closely for changes in your health, and be sure to contact your doctor if:  ? · You get muscle cramps often that do not go away after home treatment. ? · Your muscle cramps often wake you up at night. ? · You do not get better as expected. Where can you learn more? Go to http://amari-noy.info/. Enter C571 in the search box to learn more about \"Muscle Cramps: Care Instructions. \"  Current as of: March 21, 2017  Content Version: 11.4  © 6719-2669 [employer]. Care instructions adapted under license by [employer] (which disclaims liability or warranty for this information). If you have questions about a medical condition or this instruction, always ask your healthcare professional. [address] any warranty or liability for your use of this information. [employer] Activation    Thank you for requesting access to [employer]. Please follow the instructions below to securely access and download your online medical record. [employer] allows you to send messages to your doctor, view your test results, renew your prescriptions, schedule appointments, and more. How Do I Sign Up? 1. In your internet browser, go to [email or web address]. 2. Click on the First Time User? Click Here link in the Sign In box. You will see the New Member Sign Up page. 3. Enter your Codigames Access Code exactly as it appears below. You will not need to use this code after youve completed the sign-up process. If you do not sign up before the expiration date, you must request a new code.     C[employer] Access Code: [email or web address]  Expires: 2018  9:43 PM (This is the date your Year Up access code will )    4. Enter the last four digits of your Social Security Number (xxxx) and Date of Birth (mm/dd/yyyy) as indicated and click Submit. You will be taken to the next sign-up page. 5. Create a Year Up ID. This will be your Year Up login ID and cannot be changed, so think of one that is secure and easy to remember. 6. Create a Year Up password. You can change your password at any time. 7. Enter your Password Reset Question and Answer. This can be used at a later time if you forget your password. 8. Enter your e-mail address. You will receive e-mail notification when new information is available in [address] 9. Click Sign Up. You can now view and download portions of your medical record. 10. Click the Download Summary menu link to download a portable copy of your medical information. Additional Information    If you have questions, please visit the Frequently Asked Questions section of the Year Up website at [email or web address] Remember, Year Up is NOT to be used for urgent needs. For medical emergencies, dial 911.

## 2018-04-29 NOTE — ED NOTES
Complains of hand pain. Numbness resolved prior to arrival.  No other complaints Full ROM of all extremities. Speech is clear and face is symmetrical. Ambulatory in department.

## 2018-05-24 ENCOUNTER — HOSPITAL ENCOUNTER (OUTPATIENT)
Dept: MRI IMAGING | Age: 73
Discharge: HOME | End: 2018-05-24
Attending: ORTHOPAEDIC SURGERY
Payer: MEDICAID

## 2018-05-24 DIAGNOSIS — M43.10 SPONDYLISTHESIS: ICD-10-CM

## 2018-05-24 PROCEDURE — 72148 MRI LUMBAR SPINE W/O DYE: CPT

## 2018-08-03 ENCOUNTER — APPOINTMENT (OUTPATIENT)
Dept: VASCULAR SURGERY | Age: 73
End: 2018-08-03
Attending: EMERGENCY MEDICINE
Payer: MEDICAID

## 2018-08-03 ENCOUNTER — APPOINTMENT (OUTPATIENT)
Dept: GENERAL RADIOLOGY | Age: 73
End: 2018-08-03
Attending: EMERGENCY MEDICINE
Payer: MEDICAID

## 2018-08-03 ENCOUNTER — HOSPITAL ENCOUNTER (EMERGENCY)
Age: 73
Discharge: HOME | End: 2018-08-03
Attending: EMERGENCY MEDICINE
Payer: MEDICAID

## 2018-08-03 VITALS
HEART RATE: 98 BPM | WEIGHT: 137 LBS | HEIGHT: 67 IN | OXYGEN SATURATION: 100 % | RESPIRATION RATE: 16 BRPM | TEMPERATURE: 98.1 F | SYSTOLIC BLOOD PRESSURE: 165 MMHG | BODY MASS INDEX: 21.5 KG/M2 | DIASTOLIC BLOOD PRESSURE: 93 MMHG

## 2018-08-03 DIAGNOSIS — M79.604 BILATERAL LOWER EXTREMITY PAIN: ICD-10-CM

## 2018-08-03 DIAGNOSIS — R07.89 RIGHT-SIDED CHEST WALL PAIN: Primary | ICD-10-CM

## 2018-08-03 DIAGNOSIS — M79.605 BILATERAL LOWER EXTREMITY PAIN: ICD-10-CM

## 2018-08-03 LAB
ANION GAP SERPL CALC-SCNC: 5 MMOL/L (ref 3–18)
BASOPHILS # BLD: 0 K/UL (ref 0–0.1)
BASOPHILS NFR BLD: 0 % (ref 0–2)
BUN SERPL-MCNC: 16 MG/DL (ref 7–18)
BUN/CREAT SERPL: 16 (ref 12–20)
CALCIUM SERPL-MCNC: 9.5 MG/DL (ref 8.5–10.1)
CHLORIDE SERPL-SCNC: 103 MMOL/L (ref 100–108)
CO2 SERPL-SCNC: 29 MMOL/L (ref 21–32)
CREAT SERPL-MCNC: 0.98 MG/DL (ref 0.6–1.3)
DIFFERENTIAL METHOD BLD: ABNORMAL
EOSINOPHIL # BLD: 0.2 K/UL (ref 0–0.4)
EOSINOPHIL NFR BLD: 3 % (ref 0–5)
ERYTHROCYTE [DISTWIDTH] IN BLOOD BY AUTOMATED COUNT: 15.1 % (ref 11.6–14.5)
GLUCOSE SERPL-MCNC: 107 MG/DL (ref 74–99)
HCT VFR BLD AUTO: 46.5 % (ref 35–45)
HGB BLD-MCNC: 15.4 G/DL (ref 12–16)
LEFT ABI: 1.06
LEFT ANTERIOR TIBIAL: 194 MMHG
LEFT ARM BP: 179 MMHG
LEFT ATA BP LEVEL: NORMAL
LEFT TBI: 0.61
LEFT TOE PRESSURE: 111 MMHG
LYMPHOCYTES # BLD: 1.5 K/UL (ref 0.9–3.6)
LYMPHOCYTES NFR BLD: 23 % (ref 21–52)
MCH RBC QN AUTO: 29.1 PG (ref 24–34)
MCHC RBC AUTO-ENTMCNC: 33.1 G/DL (ref 31–37)
MCV RBC AUTO: 87.7 FL (ref 74–97)
MONOCYTES # BLD: 0.5 K/UL (ref 0.05–1.2)
MONOCYTES NFR BLD: 8 % (ref 3–10)
NEUTS SEG # BLD: 4.3 K/UL (ref 1.8–8)
NEUTS SEG NFR BLD: 66 % (ref 40–73)
PLATELET # BLD AUTO: 187 K/UL (ref 135–420)
PMV BLD AUTO: 11.6 FL (ref 9.2–11.8)
POTASSIUM SERPL-SCNC: 3.8 MMOL/L (ref 3.5–5.5)
RBC # BLD AUTO: 5.3 M/UL (ref 4.2–5.3)
RIGHT ABI: 1.15
RIGHT ANTERIOR TIBIAL: 211 MMHG
RIGHT ARM BP: 183 MMHG
RIGHT ATA BP LEVEL: NORMAL
RIGHT POSTERIOR TIBIAL: 186 MMHG
RIGHT TBI: 0.64
RIGHT TOE PRESSURE: 118 MMHG
SODIUM SERPL-SCNC: 137 MMOL/L (ref 136–145)
TROPONIN I SERPL-MCNC: <0.02 NG/ML (ref 0–0.04)
WBC # BLD AUTO: 6.5 K/UL (ref 4.6–13.2)

## 2018-08-03 PROCEDURE — 80048 BASIC METABOLIC PNL TOTAL CA: CPT | Performed by: EMERGENCY MEDICINE

## 2018-08-03 PROCEDURE — 85025 COMPLETE CBC W/AUTO DIFF WBC: CPT | Performed by: EMERGENCY MEDICINE

## 2018-08-03 PROCEDURE — 74011250637 HC RX REV CODE- 250/637: Performed by: EMERGENCY MEDICINE

## 2018-08-03 PROCEDURE — 96361 HYDRATE IV INFUSION ADD-ON: CPT

## 2018-08-03 PROCEDURE — 93005 ELECTROCARDIOGRAM TRACING: CPT

## 2018-08-03 PROCEDURE — 74011250636 HC RX REV CODE- 250/636: Performed by: EMERGENCY MEDICINE

## 2018-08-03 PROCEDURE — 93922 UPR/L XTREMITY ART 2 LEVELS: CPT

## 2018-08-03 PROCEDURE — 99285 EMERGENCY DEPT VISIT HI MDM: CPT

## 2018-08-03 PROCEDURE — 84484 ASSAY OF TROPONIN QUANT: CPT | Performed by: EMERGENCY MEDICINE

## 2018-08-03 PROCEDURE — 96360 HYDRATION IV INFUSION INIT: CPT

## 2018-08-03 PROCEDURE — 71046 X-RAY EXAM CHEST 2 VIEWS: CPT

## 2018-08-03 RX ORDER — OXYCODONE HYDROCHLORIDE 5 MG/1
5 TABLET ORAL ONCE
Status: COMPLETED | OUTPATIENT
Start: 2018-08-03 | End: 2018-08-03

## 2018-08-03 RX ORDER — ACETAMINOPHEN 500 MG
1000 TABLET ORAL
Qty: 30 TAB | Refills: 0 | Status: SHIPPED | OUTPATIENT
Start: 2018-08-03

## 2018-08-03 RX ORDER — ACETAMINOPHEN 500 MG
1000 TABLET ORAL
Status: COMPLETED | OUTPATIENT
Start: 2018-08-03 | End: 2018-08-03

## 2018-08-03 RX ADMIN — SODIUM CHLORIDE 1000 ML: 900 INJECTION, SOLUTION INTRAVENOUS at 12:16

## 2018-08-03 RX ADMIN — OXYCODONE HYDROCHLORIDE 5 MG: 5 TABLET ORAL at 12:16

## 2018-08-03 RX ADMIN — ACETAMINOPHEN 1000 MG: 500 TABLET, FILM COATED ORAL at 12:16

## 2018-08-03 NOTE — ED PROVIDER NOTES
EMERGENCY DEPARTMENT HISTORY AND PHYSICAL EXAM 
 
11:56 AM 
 
 
Date: 8/3/2018 Patient Name: Sandra Smith History of Presenting Illness Chief Complaint Patient presents with  Leg Pain  Breast pain History Provided By: Patient Chief Complaint: Chest pain Duration:  Hours Timing:  Constant Location: R breast 
Quality: Aching Severity: 10 out of 10 Modifying Factors: None Associated Symptoms: R arm pain Additional History (Context): Sandra Smith is a 68 y.o. female with a h/o Asthma, HTN, and AIDS who presents to the ED complaining of constant, achy, 10/10, right breast pain radiating to her right arm that began last night at 10PM while watching a movie. The patient is also complaining of b/l leg pain x 2 days. She notes that her legs hurt when walking and pain is relieved when sitting. Denies a h/o vascular disease. No other complaints or concerns at this time. PCP: Sadie Thacker MD 
 
Current Outpatient Prescriptions Medication Sig Dispense Refill  acetaminophen (TYLENOL EXTRA STRENGTH) 500 mg tablet Take 2 Tabs by mouth three (3) times daily as needed for Pain. 30 Tab 0  
 hydroCHLOROthiazide (HYDRODIURIL) 25 mg tablet Take 25 mg by mouth daily.  triamcinolone acetonide (KENALOG) 0.1 % ointment Apply 1 Dose to affected area two (2) times a day.  mirtazapine (REMERON) 15 mg tablet Take 15 mg by mouth nightly.  LORazepam (ATIVAN) 1 mg tablet Take 1 mg by mouth nightly.  mirtazapine (REMERON) 15 mg tablet Take 15 mg by mouth nightly.  trifluoperazine (STELAZINE) 5 mg tablet Take 5 mg by mouth nightly.  abacavir-lamiVUDine (EPZICOM) 600-300 mg tablet Take 1 Tab by mouth daily.  OLANZapine (ZYPREXA) 20 mg tablet Take 20 mg by mouth nightly.  lisinopril (PRINIVIL, ZESTRIL) 20 mg tablet Take 20 mg by mouth daily.  aspirin (ASPIRIN) 325 mg tablet Take 1 Tab by mouth daily.  30 Tab 0  
 amLODIPine (NORVASC) 10 mg tablet Take 1 Tab by mouth daily. 30 Tab 0  
 atorvastatin (LIPITOR) 40 mg tablet Take 1 Tab by mouth nightly. 30 Tab 0  
 trimethoprim-sulfamethoxazole (BACTRIM DS) 160-800 mg per tablet Take 1 Tab by mouth two (2) times a day. 8 Tab 0  
 gabapentin (NEURONTIN) 100 mg capsule Take 1 Cap by mouth three (3) times daily. 30 Cap 0  
 ritonavir (NORVIR) 100 mg tablet Take 100 mg by mouth two (2) times daily (with meals).  mirtazapine (REMERON) 45 mg tablet Take 45 mg by mouth nightly.  abacavir-lamiVUDine (EPZICOM) 600-300 mg tablet Take 1 Tab by mouth daily.  darunavir (PREZISTA) 600 mg tablet Take 600 mg by mouth two (2) times a day. Past History Past Medical History: 
Past Medical History:  
Diagnosis Date  AIDS ([facility] [address]  Asthma  Hypertension Past Surgical History: 
History reviewed. No pertinent surgical history. Family History: 
History reviewed. No pertinent family history. Social History: 
Social History Substance Use Topics  Smoking status: Never Smoker  Smokeless tobacco: Never Used  Alcohol use No  
 
 
Allergies: Allergies Allergen Reactions  Levaquin [Levofloxacin] Hives  Pcn [Penicillins] Hives Review of Systems Review of Systems Constitutional: Negative for chills and fever. HENT: Negative for ear pain and sore throat. Eyes: Negative for pain and visual disturbance. Respiratory: Negative for cough and shortness of breath. Cardiovascular: Positive for chest pain (R breast). Negative for palpitations. Gastrointestinal: Negative for abdominal pain, diarrhea, nausea and vomiting. Genitourinary: Negative for flank pain. Musculoskeletal: Positive for myalgias (b/l LE). Negative for back pain and neck pain. Neurological: Negative for syncope and headaches. Psychiatric/Behavioral: Negative for agitation. The patient is not nervous/anxious. Physical Exam  
 
Visit Vitals  BP (!) 165/93  Pulse 98  Temp 98.1 °F (36.7 °C)  Resp 16  
 Ht 5' 7\" (1.702 m)  Wt 62.1 kg (137 lb)  SpO2 100%  BMI 21.46 kg/m2 Physical Exam  
Constitutional: She is oriented to person, place, and time. She appears well-developed and well-nourished. HENT:  
Head: Normocephalic and atraumatic. Mouth/Throat: Oropharynx is clear and moist.  
Eyes: Pupils are equal, round, and reactive to light. No scleral icterus. Neck: Neck supple. No tracheal deviation present. Cardiovascular: Normal rate and regular rhythm. No murmur heard. Pulses: 
     Dorsalis pedis pulses are 1+ on the right side, and 2+ on the left side. Pulmonary/Chest: Effort normal and breath sounds normal. No respiratory distress. Ttp right anterior ribs No breast masses, no nipple discharge Abdominal: Soft. There is no tenderness. Musculoskeletal: Normal range of motion. She exhibits no deformity. Neurological: She is alert and oriented to person, place, and time. No gross neuro deficit Skin: Skin is warm and dry. No rash noted. She is not diaphoretic. Psychiatric: She has a normal mood and affect. Nursing note and vitals reviewed. Diagnostic Study Results Labs - Labs Reviewed CBC WITH AUTOMATED DIFF - Abnormal; Notable for the following:   
    Result Value HCT 46.5 (*)   
 RDW 15.1 (*) All other components within normal limits METABOLIC PANEL, BASIC - Abnormal; Notable for the following:   
 Glucose 107 (*)   
 GFR est non-AA 56 (*) All other components within normal limits TROPONIN I Radiologic Studies -  
XR CHEST PA LAT Final Result Impression:   
  IMPRESSION: No acute radiographic cardiopulmonary abnormality Medical Decision Making I am the first provider for this patient. I reviewed the vital signs, available nursing notes, past medical history, past surgical history, family history and social history. Vital Signs-Reviewed the patient's vital signs.  
 
Pulse Oximetry Analysis -  100% on room air (Interpretation) WNL EKG: Interpreted by the EP. Sinus tachycardia Nonspecific ST and T wave abnormality When compared with ECG of 16-NOV-2017 15:04, No significant change was found Records Reviewed: Nursing Notes and Old Medical Records (Time of Review: 11:56 AM) MDM, Progress Notes, Reevaluation, and Consults: DDX: sprain, strain, contusion, occult PNA, PVD, neuropathy, msk pain ED Course No focal findings on physical exam, atypical pain with negative cardiac work-up, BLE pain endorsed sounds like claudication pain, NOE shows no significant PVD. No life threatening causes of pain, safe to follow-up as outpatient. The patient was given: 
Medications  
sodium chloride 0.9 % bolus infusion 1,000 mL (0 mL IntraVENous IV Completed 8/3/18 1530)  
acetaminophen (TYLENOL) tablet 1,000 mg (1,000 mg Oral Given 8/3/18 1216) oxyCODONE IR (ROXICODONE) tablet 5 mg (5 mg Oral Given 8/3/18 1216) Patient has no new complaints, changes, or physical findings. Diagnostic studies were reviewed with the patient. Pt and/or family's questions and concerns were addressed. Care plan was outlined, including follow-up with PCP/specialist and return precautions were discussed. Patient is felt to be stable for discharge at this time. Diagnosis Clinical Impression: 1. Right-sided chest wall pain 2. Bilateral lower extremity pain Disposition: Home Follow-up Information Follow up With Details Comments Contact [facility] EMERGENCY DEPT  If symptoms worsen [address] 
[phone] Sharonda Chavez MD Schedule an appointment as soon as possible for a visit  [address] [facility] [id number] 
[phone] Discharge Medication List as of 8/3/2018  2:12 PM  
  
CONTINUE these medications which have CHANGED  Details  
acetaminophen (TYLENOL EXTRA STRENGTH) 500 mg tablet Take 2 Tabs by mouth three (3) times daily as needed for Pain., Print, Disp-30 Tab, R-0  
  
  
CONTINUE these medications which have NOT CHANGED Details  
hydroCHLOROthiazide (HYDRODIURIL) 25 mg tablet Take 25 mg by mouth daily. , Historical Med  
  
triamcinolone acetonide (KENALOG) 0.1 % ointment Apply 1 Dose to affected area two (2) times a day., Historical Med  
  
!! mirtazapine (REMERON) 15 mg tablet Take 15 mg by mouth nightly., Historical Med LORazepam (ATIVAN) 1 mg tablet Take 1 mg by mouth nightly., Historical Med  
  
!! mirtazapine (REMERON) 15 mg tablet Take 15 mg by mouth nightly., Historical Med  
  
trifluoperazine (STELAZINE) 5 mg tablet Take 5 mg by mouth nightly., Historical Med  
  
!! abacavir-lamiVUDine (EPZICOM) 600-300 mg tablet Take 1 Tab by mouth daily. , Historical Med OLANZapine (ZYPREXA) 20 mg tablet Take 20 mg by mouth nightly., Historical Med  
  
lisinopril (PRINIVIL, ZESTRIL) 20 mg tablet Take 20 mg by mouth daily. , Historical Med  
  
aspirin (ASPIRIN) 325 mg tablet Take 1 Tab by mouth daily. , Print, Disp-30 Tab, R-0  
  
amLODIPine (NORVASC) 10 mg tablet Take 1 Tab by mouth daily. , Print, Disp-30 Tab, R-0  
  
atorvastatin (LIPITOR) 40 mg tablet Take 1 Tab by mouth nightly. , Print, Disp-30 Tab, R-0  
  
trimethoprim-sulfamethoxazole (BACTRIM DS) 160-800 mg per tablet Take 1 Tab by mouth two (2) times a day., Print, Disp-8 Tab, R-0  
  
gabapentin (NEURONTIN) 100 mg capsule Take 1 Cap by mouth three (3) times daily. , Print, Disp-30 Cap, R-0  
  
ritonavir (NORVIR) 100 mg tablet Take 100 mg by mouth two (2) times daily (with meals). Historical Med, 100 mg  
  
!! mirtazapine (REMERON) 45 mg tablet Take 45 mg by mouth nightly. Historical Med, 45 mg  
  
!! abacavir-lamiVUDine (EPZICOM) 600-300 mg tablet Take 1 Tab by mouth daily. Historical Med, 1 Tab  
  
darunavir (PREZISTA) 600 mg tablet Take 600 mg by mouth two (2) times a day. Historical Med, 600 mg  
  
 !! - Potential duplicate medications found. Please discuss with provider.  
  
 
_______________________________ Scribe Attestation Mee Araiza acting as a scribe for and in the presence of Yi Terry DO August 03, 2018 at 11:57 AM 
    
Provider Attestation:     
I personally performed the services described in the documentation, reviewed the documentation, as recorded by the scribe in my presence, and it accurately and completely records my words and actions.  August 03, 2018 at 11:57 AM - Yi Terry DO

## 2018-08-03 NOTE — ED TRIAGE NOTES
2 days of leg pain when stands on them. Right arm hurts to breast/. Requesting pain med in triage. Says also caught cold and is coughing

## 2018-08-03 NOTE — DISCHARGE INSTRUCTIONS
Leg Pain: Care Instructions  Your Care Instructions  Many things can cause leg pain. Too much exercise or overuse can cause a muscle cramp (or charley horse). You can get leg cramps from not eating a balanced diet that has enough potassium, calcium, and other minerals. If you do not drink enough fluids or are taking certain medicines, you may develop leg cramps. Other causes of leg pain include injuries, blood flow problems, nerve damage, and twisted and enlarged veins (varicose veins). You can usually ease pain with self-care. Your doctor may recommend that you rest your leg and keep it elevated. Follow-up care is a key part of your treatment and safety. Be sure to make and go to all appointments, and call your doctor if you are having problems. It's also a good idea to know your test results and keep a list of the medicines you take. How can you care for yourself at home? · Take pain medicines exactly as directed. ¨ If the doctor gave you a prescription medicine for pain, take it as prescribed. ¨ If you are not taking a prescription pain medicine, ask your doctor if you can take an over-the-counter medicine. · Take any other medicines exactly as prescribed. Call your doctor if you think you are having a problem with your medicine. · Rest your leg while you have pain, and avoid standing for long periods of time. · Prop up your leg at or above the level of your heart when possible. · Make sure you are eating a balanced diet that is rich in calcium, potassium, and magnesium, especially if you are pregnant. · If directed by your doctor, put ice or a cold pack on the area for 10 to 20 minutes at a time. Put a thin cloth between the ice and your skin. · Your leg may be in a splint, a brace, or an elastic bandage, and you may have crutches to help you walk. Follow your doctor's directions about how long to wear supports and how to use the crutches. When should you call for help?   Call 911 anytime you think you may need emergency care. For example, call if:    · You have sudden chest pain and shortness of breath, or you cough up blood.     · Your leg is cool or pale or changes color.    Call your doctor now or seek immediate medical care if:    · You have increasing or severe pain.     · Your leg suddenly feels weak and you cannot move it.     · You have signs of a blood clot, such as:  ¨ Pain in your calf, back of the knee, thigh, or groin. ¨ Redness and swelling in your leg or groin.     · You have signs of infection, such as:  ¨ Increased pain, swelling, warmth, or redness. ¨ Red streaks leading from the sore area. ¨ Pus draining from a place on your leg. ¨ A fever.     · You cannot bear weight on your leg.    Watch closely for changes in your health, and be sure to contact your doctor if:    · You do not get better as expected. Where can you learn more? Go to http://amari-noy.info/. Enter V301 in the search box to learn more about \"Leg Pain: Care Instructions. \"  Current as of: November 20, 2017  Content Version: 11.7  © 2935-5185 [employer]. Care instructions adapted under license by [employer] (which disclaims liability or warranty for this information). If you have questions about a medical condition or this instruction, always ask your healthcare professional. [place] [address] any warranty or liability for your use of this information. Musculoskeletal Chest Pain: Care Instructions  Your Care Instructions    Chest pain is not always a sign that something is wrong with your heart or that you have another serious problem. The doctor thinks your chest pain is caused by strained muscles or ligaments, inflamed chest cartilage, or another problem in your chest, rather than by your heart. You may need more tests to find the cause of your chest pain. Follow-up care is a key part of your treatment and safety.  Be sure to make and go to all appointments, and call your doctor if you are having problems. It's also a good idea to know your test results and keep a list of the medicines you take. How can you care for yourself at home? · Take pain medicines exactly as directed. ¨ If the doctor gave you a prescription medicine for pain, take it as prescribed. ¨ If you are not taking a prescription pain medicine, ask your doctor if you can take an over-the-counter medicine. · Rest and protect the sore area. · Stop, change, or take a break from any activity that may be causing your pain or soreness. · Put ice or a cold pack on the sore area for 10 to 20 minutes at a time. Try to do this every 1 to 2 hours for the next 3 days (when you are awake) or until the swelling goes down. Put a thin cloth between the ice and your skin. · After 2 or 3 days, apply a heating pad set on low or a warm cloth to the area that hurts. Some doctors suggest that you go back and forth between hot and cold. · Do not wrap or tape your ribs for support. This may cause you to take smaller breaths, which could increase your risk of lung problems. · Mentholated creams such as Bengay or Icy Hot may soothe sore muscles. Follow the instructions on the package. · Follow your doctor's instructions for exercising. · Gentle stretching and massage may help you get better faster. Stretch slowly to the point just before pain begins, and hold the stretch for at least 15 to 30 seconds. Do this 3 or 4 times a day. Stretch just after you have applied heat. · As your pain gets better, slowly return to your normal activities. Any increased pain may be a sign that you need to rest a while longer. When should you call for help? Call 911 anytime you think you may need emergency care. For example, call if:    · You have chest pain or pressure. This may occur with:  ¨ Sweating. ¨ Shortness of breath. ¨ Nausea or vomiting.   ¨ Pain that spreads from the chest to the neck, jaw, or one or both shoulders or arms. ¨ Dizziness or lightheadedness. ¨ A fast or uneven pulse. After calling 911, chew 1 adult-strength aspirin. Wait for an ambulance. Do not try to drive yourself.     · You have sudden chest pain and shortness of breath, or you cough up blood.    Call your doctor now or seek immediate medical care if:    · You have any trouble breathing.     · Your chest pain gets worse.     · Your chest pain occurs consistently with exercise and is relieved by rest.    Watch closely for changes in your health, and be sure to contact your doctor if:    · Your chest pain does not get better after 1 week. Where can you learn more? Go to http://amari-noy.info/. Enter V293 in the search box to learn more about \"Musculoskeletal Chest Pain: Care Instructions. \"  Current as of: November 20, 2017  Content Version: 11.7  © 7994-9887 [employer]. Care instructions adapted under license by [employer] (which disclaims liability or warranty for this information). If you have questions about a medical condition or this instruction, always ask your healthcare professional. [address] any warranty or liability for your use of this information.

## 2018-08-04 LAB
ATRIAL RATE: 122 BPM
CALCULATED P AXIS, ECG09: 73 DEGREES
CALCULATED R AXIS, ECG10: 4 DEGREES
CALCULATED T AXIS, ECG11: 92 DEGREES
DIAGNOSIS, 93000: NORMAL
P-R INTERVAL, ECG05: 122 MS
Q-T INTERVAL, ECG07: 332 MS
QRS DURATION, ECG06: 84 MS
QTC CALCULATION (BEZET), ECG08: 473 MS
VENTRICULAR RATE, ECG03: 122 BPM

## 2018-12-27 ENCOUNTER — APPOINTMENT (OUTPATIENT)
Dept: GENERAL RADIOLOGY | Age: 73
End: 2018-12-27
Attending: EMERGENCY MEDICINE
Payer: MEDICAID

## 2018-12-27 ENCOUNTER — APPOINTMENT (OUTPATIENT)
Dept: CT IMAGING | Age: 73
End: 2018-12-27
Attending: EMERGENCY MEDICINE
Payer: MEDICAID

## 2018-12-27 ENCOUNTER — HOSPITAL ENCOUNTER (EMERGENCY)
Age: 73
Discharge: HOME | End: 2018-12-27
Attending: EMERGENCY MEDICINE
Payer: MEDICAID

## 2018-12-27 VITALS
DIASTOLIC BLOOD PRESSURE: 103 MMHG | TEMPERATURE: 97.5 F | WEIGHT: 133 LBS | OXYGEN SATURATION: 99 % | HEIGHT: 67 IN | HEART RATE: 93 BPM | RESPIRATION RATE: 17 BRPM | SYSTOLIC BLOOD PRESSURE: 219 MMHG | BODY MASS INDEX: 20.88 KG/M2

## 2018-12-27 DIAGNOSIS — H81.393 PERIPHERAL VERTIGO OF BOTH EARS: Primary | ICD-10-CM

## 2018-12-27 LAB
ANION GAP SERPL CALC-SCNC: 7 MMOL/L (ref 3–18)
BASOPHILS # BLD: 0 K/UL (ref 0–0.1)
BASOPHILS NFR BLD: 0 % (ref 0–2)
BNP SERPL-MCNC: 559 PG/ML (ref 0–900)
BUN SERPL-MCNC: 12 MG/DL (ref 7–18)
BUN/CREAT SERPL: 13 (ref 12–20)
CALCIUM SERPL-MCNC: 8.7 MG/DL (ref 8.5–10.1)
CHLORIDE SERPL-SCNC: 105 MMOL/L (ref 100–108)
CK MB CFR SERPL CALC: 1.5 % (ref 0–4)
CK MB SERPL-MCNC: 1.7 NG/ML (ref 5–25)
CK SERPL-CCNC: 112 U/L (ref 26–192)
CO2 SERPL-SCNC: 26 MMOL/L (ref 21–32)
CREAT SERPL-MCNC: 0.92 MG/DL (ref 0.6–1.3)
DIFFERENTIAL METHOD BLD: ABNORMAL
EOSINOPHIL # BLD: 0.2 K/UL (ref 0–0.4)
EOSINOPHIL NFR BLD: 4 % (ref 0–5)
ERYTHROCYTE [DISTWIDTH] IN BLOOD BY AUTOMATED COUNT: 14.7 % (ref 11.6–14.5)
GLUCOSE SERPL-MCNC: 127 MG/DL (ref 74–99)
HCT VFR BLD AUTO: 43.3 % (ref 35–45)
HGB BLD-MCNC: 14.3 G/DL (ref 12–16)
LYMPHOCYTES # BLD: 1.9 K/UL (ref 0.9–3.6)
LYMPHOCYTES NFR BLD: 36 % (ref 21–52)
MCH RBC QN AUTO: 29.6 PG (ref 24–34)
MCHC RBC AUTO-ENTMCNC: 33 G/DL (ref 31–37)
MCV RBC AUTO: 89.6 FL (ref 74–97)
MONOCYTES # BLD: 0.5 K/UL (ref 0.05–1.2)
MONOCYTES NFR BLD: 9 % (ref 3–10)
NEUTS SEG # BLD: 2.7 K/UL (ref 1.8–8)
NEUTS SEG NFR BLD: 51 % (ref 40–73)
PLATELET # BLD AUTO: 239 K/UL (ref 135–420)
PMV BLD AUTO: 11.7 FL (ref 9.2–11.8)
POTASSIUM SERPL-SCNC: 3.5 MMOL/L (ref 3.5–5.5)
RBC # BLD AUTO: 4.83 M/UL (ref 4.2–5.3)
SODIUM SERPL-SCNC: 138 MMOL/L (ref 136–145)
TROPONIN I SERPL-MCNC: <0.02 NG/ML (ref 0–0.04)
WBC # BLD AUTO: 5.2 K/UL (ref 4.6–13.2)

## 2018-12-27 PROCEDURE — 83880 ASSAY OF NATRIURETIC PEPTIDE: CPT

## 2018-12-27 PROCEDURE — 80048 BASIC METABOLIC PNL TOTAL CA: CPT

## 2018-12-27 PROCEDURE — 70450 CT HEAD/BRAIN W/O DYE: CPT

## 2018-12-27 PROCEDURE — 82553 CREATINE MB FRACTION: CPT

## 2018-12-27 PROCEDURE — 99284 EMERGENCY DEPT VISIT MOD MDM: CPT

## 2018-12-27 PROCEDURE — 74011250637 HC RX REV CODE- 250/637: Performed by: EMERGENCY MEDICINE

## 2018-12-27 PROCEDURE — 71045 X-RAY EXAM CHEST 1 VIEW: CPT

## 2018-12-27 PROCEDURE — 74011250636 HC RX REV CODE- 250/636: Performed by: EMERGENCY MEDICINE

## 2018-12-27 PROCEDURE — 93005 ELECTROCARDIOGRAM TRACING: CPT

## 2018-12-27 PROCEDURE — 85025 COMPLETE CBC W/AUTO DIFF WBC: CPT

## 2018-12-27 RX ORDER — HYDROCHLOROTHIAZIDE 25 MG/1
25 TABLET ORAL
Status: COMPLETED | OUTPATIENT
Start: 2018-12-27 | End: 2018-12-27

## 2018-12-27 RX ORDER — MECLIZINE HYDROCHLORIDE 25 MG/1
25 TABLET ORAL
Qty: 30 TAB | Refills: 0 | Status: SHIPPED | OUTPATIENT
Start: 2018-12-27 | End: 2019-01-06

## 2018-12-27 RX ORDER — LISINOPRIL 20 MG/1
20 TABLET ORAL
Status: COMPLETED | OUTPATIENT
Start: 2018-12-27 | End: 2018-12-27

## 2018-12-27 RX ORDER — AMLODIPINE BESYLATE 5 MG/1
10 TABLET ORAL
Status: COMPLETED | OUTPATIENT
Start: 2018-12-27 | End: 2018-12-27

## 2018-12-27 RX ORDER — MECLIZINE HCL 12.5 MG 12.5 MG/1
25 TABLET ORAL
Status: COMPLETED | OUTPATIENT
Start: 2018-12-27 | End: 2018-12-27

## 2018-12-27 RX ADMIN — AMLODIPINE BESYLATE 10 MG: 5 TABLET ORAL at 18:07

## 2018-12-27 RX ADMIN — LISINOPRIL 20 MG: 20 TABLET ORAL at 18:07

## 2018-12-27 RX ADMIN — SODIUM CHLORIDE 1000 ML: 900 INJECTION, SOLUTION INTRAVENOUS at 18:09

## 2018-12-27 RX ADMIN — HYDROCHLOROTHIAZIDE 25 MG: 25 TABLET ORAL at 18:07

## 2018-12-27 RX ADMIN — MECLIZINE 25 MG: 12.5 TABLET ORAL at 18:07

## 2018-12-27 NOTE — ED PROVIDER NOTES
EMERGENCY DEPARTMENT HISTORY AND PHYSICAL EXAM    5:30 PM      Date: 12/27/2018  Patient Name: George Ledesma    History of Presenting Illness     Chief Complaint   Patient presents with    Dizziness         History Provided By: Patient      Additional History (Context): George Ledesma is a 68 y.o. female with PMHx of Asthma, AIDS, and HTN who presents with intermittent dizziness, with 0/10 pain, onset 1 month ago. Patient reports with this episode today, she went to get something to eat and states \"the house was moving around. \" Notes her dizziness is only brought on when standing. Denies it is exacerbated with head positioning. Also reports nausea, and 2 episodes of vomiting this morning. Denies other associated symptoms, such as weakness and numbness. When asked about her HTN, she states she forgot to take her medications today. Patient reports hx of HIV. Reports she is compliant with her medication. Notes her viral load is undetectable. No other concerns were expressed at this time. PCP: Sandy Lo MD    Current Outpatient Medications   Medication Sig Dispense Refill    acetaminophen (TYLENOL EXTRA STRENGTH) 500 mg tablet Take 2 Tabs by mouth three (3) times daily as needed for Pain. 30 Tab 0    hydroCHLOROthiazide (HYDRODIURIL) 25 mg tablet Take 25 mg by mouth daily.  triamcinolone acetonide (KENALOG) 0.1 % ointment Apply 1 Dose to affected area two (2) times a day.  mirtazapine (REMERON) 15 mg tablet Take 15 mg by mouth nightly.  LORazepam (ATIVAN) 1 mg tablet Take 1 mg by mouth nightly.  mirtazapine (REMERON) 15 mg tablet Take 15 mg by mouth nightly.  trifluoperazine (STELAZINE) 5 mg tablet Take 5 mg by mouth nightly.  abacavir-lamiVUDine (EPZICOM) 600-300 mg tablet Take 1 Tab by mouth daily.  OLANZapine (ZYPREXA) 20 mg tablet Take 20 mg by mouth nightly.  lisinopril (PRINIVIL, ZESTRIL) 20 mg tablet Take 20 mg by mouth daily.       aspirin (ASPIRIN) 325 mg tablet Take 1 Tab by mouth daily. 30 Tab 0    amLODIPine (NORVASC) 10 mg tablet Take 1 Tab by mouth daily. 30 Tab 0    atorvastatin (LIPITOR) 40 mg tablet Take 1 Tab by mouth nightly. 30 Tab 0    trimethoprim-sulfamethoxazole (BACTRIM DS) 160-800 mg per tablet Take 1 Tab by mouth two (2) times a day. 8 Tab 0    gabapentin (NEURONTIN) 100 mg capsule Take 1 Cap by mouth three (3) times daily. 30 Cap 0    ritonavir (NORVIR) 100 mg tablet Take 100 mg by mouth two (2) times daily (with meals).  mirtazapine (REMERON) 45 mg tablet Take 45 mg by mouth nightly.  abacavir-lamiVUDine (EPZICOM) 600-300 mg tablet Take 1 Tab by mouth daily.  darunavir (PREZISTA) 600 mg tablet Take 600 mg by mouth two (2) times a day. Past History     Past Medical History:  Past Medical History:   Diagnosis Date    AIDS ([facility] [address]     Asthma     Hypertension        Past Surgical History:  History reviewed. No pertinent surgical history. Family History:  History reviewed. No pertinent family history. Social History:  Social History     Tobacco Use    Smoking status: Never Smoker    Smokeless tobacco: Never Used   Substance Use Topics    Alcohol use: No    Drug use: No       Allergies: Allergies   Allergen Reactions    Levaquin [Levofloxacin] Hives    Pcn [Penicillins] Hives         Review of Systems       Review of Systems   Constitutional: Negative for fever. Gastrointestinal: Positive for nausea and vomiting. Neurological: Positive for dizziness. Negative for weakness and numbness. All other systems reviewed and are negative. Physical Exam     Visit Vitals  BP (!) 189/103   Pulse 93   Temp 97.5 °F (36.4 °C)   Resp 17   Ht 5' 7\" (1.702 m)   Wt 60.3 kg (133 lb)   SpO2 99%   BMI 20.83 kg/m²         Physical Exam   Constitutional: She is oriented to person, place, and time. She appears well-developed and well-nourished. HENT:   Head: Normocephalic and atraumatic.    Eyes: EOM are normal. Pupils are equal, round, and reactive to light. Right eye exhibits no discharge. Left eye exhibits no discharge. Neck: Normal range of motion. Neck supple. No JVD present. No tracheal deviation present. Cardiovascular: Normal rate, regular rhythm and normal heart sounds. No murmur heard. Pulmonary/Chest: Effort normal and breath sounds normal. No respiratory distress. She has no wheezes. She has no rales. Abdominal: Soft. Bowel sounds are normal. She exhibits no distension. There is no tenderness. There is no rebound. Musculoskeletal: Normal range of motion. She exhibits no tenderness or deformity. Neurological: She is alert and oriented to person, place, and time. She has normal strength. No cranial nerve deficit or sensory deficit. Positive Coretta-Hallpike bilaterally   5/5 strength in bilateral upper and lower extremities   Normal finger-to-nose test   Normal rapid alternating movements    Skin: Skin is warm and dry. No rash noted. No erythema. Psychiatric: She has a normal mood and affect.  Her behavior is normal.         Diagnostic Study Results     Labs -  Recent Results (from the past 12 hour(s))   EKG, 12 LEAD, INITIAL    Collection Time: 12/27/18  5:38 PM   Result Value Ref Range    Ventricular Rate 105 BPM    Atrial Rate 105 BPM    P-R Interval 142 ms    QRS Duration 90 ms    Q-T Interval 374 ms    QTC Calculation (Bezet) 494 ms    Calculated P Axis 71 degrees    Calculated R Axis 44 degrees    Calculated T Axis 82 degrees    Diagnosis       Sinus tachycardia  Right atrial enlargement  Borderline ECG  When compared with ECG of 03-AUG-2018 10:57,  No significant change was found     CBC WITH AUTOMATED DIFF    Collection Time: 12/27/18  5:50 PM   Result Value Ref Range    WBC 5.2 4.6 - 13.2 K/uL    RBC 4.83 4.20 - 5.30 M/uL    HGB 14.3 12.0 - 16.0 g/dL    HCT 43.3 35.0 - 45.0 %    MCV 89.6 74.0 - 97.0 FL    MCH 29.6 24.0 - 34.0 PG    MCHC 33.0 31.0 - 37.0 g/dL    RDW 14.7 (H) 11.6 - 14.5 %    PLATELET 239 135 - 420 K/uL    MPV 11.7 9.2 - 11.8 FL    NEUTROPHILS 51 40 - 73 %    LYMPHOCYTES 36 21 - 52 %    MONOCYTES 9 3 - 10 %    EOSINOPHILS 4 0 - 5 %    BASOPHILS 0 0 - 2 %    ABS. NEUTROPHILS 2.7 1.8 - 8.0 K/UL    ABS. LYMPHOCYTES 1.9 0.9 - 3.6 K/UL    ABS. MONOCYTES 0.5 0.05 - 1.2 K/UL    ABS. EOSINOPHILS 0.2 0.0 - 0.4 K/UL    ABS. BASOPHILS 0.0 0.0 - 0.1 K/UL    DF AUTOMATED     METABOLIC PANEL, BASIC    Collection Time: 12/27/18  5:50 PM   Result Value Ref Range    Sodium 138 136 - 145 mmol/L    Potassium 3.5 3.5 - 5.5 mmol/L    Chloride 105 100 - 108 mmol/L    CO2 26 21 - 32 mmol/L    Anion gap 7 3.0 - 18 mmol/L    Glucose 127 (H) 74 - 99 mg/dL    BUN 12 7.0 - 18 MG/DL    Creatinine 0.92 0.6 - 1.3 MG/DL    BUN/Creatinine ratio 13 12 - 20      GFR est AA >60 >60 ml/min/1.73m2    GFR est non-AA >60 >60 ml/min/1.73m2    Calcium 8.7 8.5 - 10.1 MG/DL   CARDIAC PANEL,(CK, CKMB & TROPONIN)    Collection Time: 12/27/18  5:50 PM   Result Value Ref Range     26 - 192 U/L    CK - MB 1.7 <3.6 ng/ml    CK-MB Index 1.5 0.0 - 4.0 %    Troponin-I, QT <0.02 0.0 - 0.045 NG/ML   NT-PRO BNP    Collection Time: 12/27/18  5:50 PM   Result Value Ref Range    NT pro- 0 - 900 PG/ML       Radiologic Studies -   XR CHEST PORT    (Results Pending)   CT HEAD WO CONT    (Results Pending)         Medical Decision Making   I am the first provider for this patient. I reviewed the vital signs, available nursing notes, past medical history, past surgical history, family history and social history. Vital Signs-Reviewed the patient's vital signs. EKG: Interpreted by the EP. Time Interpreted: 5:39PM   Rate: 105bpm   Rhythm: Sinus Tachycardia    Interpretation: Prolonged QTc; Otherwise, normal intervals; Normal axis; LVH; No signs of ischemia    Comparison: When compared with EKG of 8/3/18, no significant change was found.      Records Reviewed: Nursing Notes, Previous electrocardiograms and Triage notes       Provider Notes (Medical Decision Making):     MDM  Number of Diagnoses or Management Options  Diagnosis management comments: Diff dx: BPPV, dehdyration, PRES, posterior CVA    Pt having vertigo sx for 1 mo, worse with standing, + silvio hallpike bilat, no signs of cva on HiNTs. Very hypertensive, hasn't been taking her home bp meds, will give them here. Based on her symptoms being very positional, likely BPPV, will give meclzine. Very low suspicion for posterior CVA. She does have hx AIDs but compliant with those meds, she thinks her viral load is undetectable and cd4 is fine taking other causes of dizziness off table such as toxo, cmv etc. Will ct head 2/2 few falls, am headaches. Basic labs ordered up front, will follow these up, give fluids, reassess    6:50 PM  Pt has ambulated without falling, feeling better, labs unremarkable, cxr clear. Safe for dc, will d/c with isntructions for the epley maneuver, ENT f/u if sx continue    Safe for d/c, careful return precautions given. Pt/family comfortable with plan    Paulina Boykin MD            Diagnosis     Clinical Impression:   1. Peripheral vertigo of both ears        Disposition: Signed out to Dr. Shana Gonzalez, ED Provider     Follow-up Information     Follow up With Specialties Details Why Contact Maryann Garcia MD Internal Medicine In 3 days  [address] [id number]      [facility] EMERGENCY DEPT Emergency Medicine  As needed, If symptoms worsen [address]  [phone]    Rajesh Johnson MD Otolaryngology In 1 week If symptoms worsen [address] [facility]  [phone]                Medication List      ASK your doctor about these medications    acetaminophen 500 mg tablet  Commonly known as:  TYLENOL EXTRA STRENGTH  Take 2 Tabs by mouth three (3) times daily as needed for Pain. amLODIPine 10 mg tablet  Commonly known as:  NORVASC  Take 1 Tab by mouth daily.      aspirin 325 mg tablet  Commonly known as: ASPIRIN  Take 1 Tab by mouth daily. atorvastatin 40 mg tablet  Commonly known as:  LIPITOR  Take 1 Tab by mouth nightly. * EPZICOM 600-300 mg tablet  Generic drug:  abacavir-lamiVUDine     * abacavir-lamiVUDine 600-300 mg tablet  Commonly known as:  EPZICOM     gabapentin 100 mg capsule  Commonly known as:  NEURONTIN  Take 1 Cap by mouth three (3) times daily. hydroCHLOROthiazide 25 mg tablet  Commonly known as:  HYDRODIURIL     lisinopril 20 mg tablet  Commonly known as:  PRINIVIL, ZESTRIL     LORazepam 1 mg tablet  Commonly known as:  ATIVAN     * mirtazapine 45 mg tablet  Commonly known as:  REMERON     * mirtazapine 15 mg tablet  Commonly known as:  REMERON     * mirtazapine 15 mg tablet  Commonly known as:  REMERON     NORVIR 100 mg tablet  Generic drug:  ritonavir     OLANZapine 20 mg tablet  Commonly known as:  ZyPREXA     PREZISTA 600 mg tablet  Generic drug:  darunavir ethanolate     triamcinolone acetonide 0.1 % ointment  Commonly known as:  KENALOG     trifluoperazine 5 mg tablet  Commonly known as:  STELAZINE     trimethoprim-sulfamethoxazole 160-800 mg per tablet  Commonly known as:  BACTRIM DS  Take 1 Tab by mouth two (2) times a day. * This list has 5 medication(s) that are the same as other medications prescribed for you. Read the directions carefully, and ask your doctor or other care provider to review them with you.              _______________________________    Attestations:  Trace Collins [id number] acting as a scribe for and in the presence of Marisela Carson MD      December 27, 2018 at 5:30 PM       Provider Attestation:      I personally performed the services described in the documentation, reviewed the documentation, as recorded by the scribe in my presence, and it accurately and completely records my words and actions.  December 27, 2018 at 5:30 PM - Marisela Carson MD    _______________________________

## 2018-12-27 NOTE — ED NOTES
Patient has had 1 month of intermittent dizziness with standing up, but not every time. 'Off balance' but no vertigo.

## 2018-12-27 NOTE — DISCHARGE INSTRUCTIONS
Epley Maneuver at Home for Vertigo: Exercises  Your Care Instructions  Vertigo is a spinning or whirling sensation when you move your head. Your doctor may have moved you in different positions to help your vertigo get better faster. This is called the Epley maneuver. Your doctor also may have asked you to do these exercises at home. Do the exercises as often as your doctor recommends. If your vertigo is getting worse, your doctor may have you change the exercise or stop it. Step 1  Step 1    1. Sit on the edge of a bed or sofa. Step 2    1. Turn your head 45 degrees in the direction your doctor told you to. This should be toward the ear that causes the most vertigo for you. In this picture, the woman is turning toward her left ear. Step 3    1. Tilt yourself backward until you are lying on your back. Your head should still be at a 45-degree turn. Your head should be about midway between looking straight ahead and looking out to your side. Hold for 30 seconds. If you have vertigo, stay in this position until it stops. Step 4    1. Turn your head 90 degrees toward the ear that has the least vertigo. In this picture, the woman is turning to the right because she has vertigo on her left side. The point of your chin should be raised and over your shoulder. Hold for 30 seconds. Step 5    1. Roll onto the side with the least vertigo. You should now be looking at the floor. Hold for 30 seconds. Follow-up care is a key part of your treatment and safety. Be sure to make and go to all appointments, and call your doctor if you are having problems. It's also a good idea to know your test results and keep a list of the medicines you take. Where can you learn more? Go to http://amari-noy.info/. Enter 470 0937 in the search box to learn more about \"Epley Maneuver at Home for Vertigo: Exercises. \"  Current as of: June 4, 2018  Content Version: 11.8  © 6229-9615 Healthwise, Incorporated. Care instructions adapted under license by "[employer]" (which disclaims liability or warranty for this information). If you have questions about a medical condition or this instruction, always ask your healthcare professional. [address] any warranty or liability for your use of this information. Vertigo: Care Instructions  Your Care Instructions    Vertigo is the feeling that you or your surroundings are moving when there is no actual movement. It is often described as a feeling of spinning, whirling, falling, or tilting. Vertigo may make you vomit or feel nauseated. You may have trouble standing or walking and may lose your balance. Vertigo is often related to an inner ear problem, but it can have other more serious causes. If vertigo continues, you may need more tests to find its cause. Follow-up care is a key part of your treatment and safety. Be sure to make and go to all appointments, and call your doctor if you are having problems. It's also a good idea to know your test results and keep a list of the medicines you take. How can you care for yourself at home? · Do not lie flat on your back. Prop yourself up slightly. This may reduce the spinning feeling. Keep your eyes open. · Move slowly so that you do not fall. · If your doctor recommends medicine, take it exactly as directed. · Do not drive while you are having vertigo. Certain exercises, called Rios-Daroff exercises, can help decrease vertigo. To do Rios-Daroff exercises:  · Sit on the edge of a bed or sofa and quickly lie down on the side that causes the worst vertigo. Lie on your side with your ear down. · Stay in this position for at least 30 seconds or until the vertigo goes away. · Sit up. If this causes vertigo, wait for it to stop. · Repeat the procedure on the other side. · Repeat this 10 times. Do these exercises 2 times a day until the vertigo is gone.   When should you call for help?  Call 911 anytime you think you may need emergency care. For example, call if:    · You passed out (lost consciousness).     · You have symptoms of a stroke. These may include:  ? Sudden numbness, tingling, weakness, or loss of movement in your face, arm, or leg, especially on only one side of your body. ? Sudden vision changes. ? Sudden trouble speaking. ? Sudden confusion or trouble understanding simple statements. ? Sudden problems with walking or balance. ? A sudden, severe headache that is different from past headaches.    Call your doctor now or seek immediate medical care if:    · Vertigo occurs with a fever, a headache, or ringing in your ears.     · You have new or increased nausea and vomiting.    Watch closely for changes in your health, and be sure to contact your doctor if:    · Vertigo gets worse or happens more often.     · Vertigo has not gotten better after 2 weeks. Where can you learn more? Go to http://amari-noy.info/. Enter W044 in the search box to learn more about \"Vertigo: Care Instructions. \"  Current as of: March 28, 2018  Content Version: 11.8  © 7291-5208 Healthwise, Incorporated. Care instructions adapted under license by [employer] (which disclaims liability or warranty for this information). If you have questions about a medical condition or this instruction, always ask your healthcare professional. [address] any warranty or liability for your use of this information.

## 2018-12-28 ENCOUNTER — HOSPITAL ENCOUNTER (EMERGENCY)
Age: 73
Discharge: STILL ADMITTED OR ADMINISTRATIVE | End: 2018-12-28
Attending: EMERGENCY MEDICINE
Payer: MEDICAID

## 2018-12-28 LAB
ATRIAL RATE: 105 BPM
CALCULATED P AXIS, ECG09: 71 DEGREES
CALCULATED R AXIS, ECG10: 44 DEGREES
CALCULATED T AXIS, ECG11: 82 DEGREES
DIAGNOSIS, 93000: NORMAL
P-R INTERVAL, ECG05: 142 MS
Q-T INTERVAL, ECG07: 374 MS
QRS DURATION, ECG06: 90 MS
QTC CALCULATION (BEZET), ECG08: 494 MS
VENTRICULAR RATE, ECG03: 105 BPM

## 2018-12-28 PROCEDURE — 75810000275 HC EMERGENCY DEPT VISIT NO LEVEL OF CARE

## 2018-12-28 NOTE — ED NOTES
I have reviewed discharge instructions with the patient. The patient verbalized understanding. Patient armband removed and shredded    Patient ambulatory to ED lobby where is going to meet her cab.

## 2019-01-17 ENCOUNTER — HOSPITAL ENCOUNTER (OUTPATIENT)
Dept: LAB | Age: 74
Discharge: HOME | End: 2019-01-17

## 2019-01-17 LAB — SENTARA SPECIMEN COL,SENBCF: NORMAL

## 2019-01-17 PROCEDURE — 99001 SPECIMEN HANDLING PT-LAB: CPT

## 2019-12-02 ENCOUNTER — HOSPITAL ENCOUNTER (EMERGENCY)
Age: 74
Discharge: HOME | End: 2019-12-02
Attending: EMERGENCY MEDICINE | Admitting: EMERGENCY MEDICINE
Payer: MEDICAID

## 2019-12-02 ENCOUNTER — APPOINTMENT (OUTPATIENT)
Dept: GENERAL RADIOLOGY | Age: 74
End: 2019-12-02
Attending: EMERGENCY MEDICINE
Payer: MEDICAID

## 2019-12-02 VITALS
DIASTOLIC BLOOD PRESSURE: 88 MMHG | RESPIRATION RATE: 23 BRPM | TEMPERATURE: 97.8 F | HEART RATE: 106 BPM | OXYGEN SATURATION: 99 % | SYSTOLIC BLOOD PRESSURE: 167 MMHG

## 2019-12-02 DIAGNOSIS — R11.2 NON-INTRACTABLE VOMITING WITH NAUSEA, UNSPECIFIED VOMITING TYPE: ICD-10-CM

## 2019-12-02 DIAGNOSIS — R55 NEAR SYNCOPE: Primary | ICD-10-CM

## 2019-12-02 LAB
ALBUMIN SERPL-MCNC: 3.7 G/DL (ref 3.4–5)
ALBUMIN/GLOB SERPL: 0.6 {RATIO} (ref 0.8–1.7)
ALP SERPL-CCNC: 67 U/L (ref 45–117)
ALT SERPL-CCNC: 42 U/L (ref 13–56)
ANION GAP SERPL CALC-SCNC: 5 MMOL/L (ref 3–18)
APPEARANCE UR: ABNORMAL
AST SERPL-CCNC: 63 U/L (ref 10–38)
ATRIAL RATE: 94 BPM
BACTERIA URNS QL MICRO: ABNORMAL /HPF
BASOPHILS # BLD: 0.1 K/UL (ref 0–0.1)
BASOPHILS NFR BLD: 1 % (ref 0–2)
BILIRUB SERPL-MCNC: 1 MG/DL (ref 0.2–1)
BILIRUB UR QL: NEGATIVE
BUN SERPL-MCNC: 18 MG/DL (ref 7–18)
BUN/CREAT SERPL: 15 (ref 12–20)
CALCIUM SERPL-MCNC: 9.4 MG/DL (ref 8.5–10.1)
CALCULATED P AXIS, ECG09: 68 DEGREES
CALCULATED R AXIS, ECG10: 19 DEGREES
CALCULATED T AXIS, ECG11: 75 DEGREES
CHLORIDE SERPL-SCNC: 100 MMOL/L (ref 100–111)
CK MB CFR SERPL CALC: 1 % (ref 0–4)
CK MB SERPL-MCNC: 5.7 NG/ML (ref 5–25)
CK SERPL-CCNC: 561 U/L (ref 26–192)
CO2 SERPL-SCNC: 30 MMOL/L (ref 21–32)
COLOR UR: ABNORMAL
CREAT SERPL-MCNC: 1.2 MG/DL (ref 0.6–1.3)
DIAGNOSIS, 93000: NORMAL
DIFFERENTIAL METHOD BLD: ABNORMAL
EOSINOPHIL # BLD: 0.3 K/UL (ref 0–0.4)
EOSINOPHIL NFR BLD: 3 % (ref 0–5)
EPITH CASTS URNS QL MICRO: ABNORMAL /LPF (ref 0–5)
ERYTHROCYTE [DISTWIDTH] IN BLOOD BY AUTOMATED COUNT: 13.4 % (ref 11.6–14.5)
GLOBULIN SER CALC-MCNC: 5.9 G/DL (ref 2–4)
GLUCOSE SERPL-MCNC: 110 MG/DL (ref 74–99)
GLUCOSE UR STRIP.AUTO-MCNC: NEGATIVE MG/DL
HCT VFR BLD AUTO: 43.3 % (ref 35–45)
HGB BLD-MCNC: 14.2 G/DL (ref 12–16)
HGB UR QL STRIP: NEGATIVE
KETONES UR QL STRIP.AUTO: NEGATIVE MG/DL
LEUKOCYTE ESTERASE UR QL STRIP.AUTO: ABNORMAL
LIPASE SERPL-CCNC: 120 U/L (ref 73–393)
LYMPHOCYTES # BLD: 2 K/UL (ref 0.9–3.6)
LYMPHOCYTES NFR BLD: 24 % (ref 21–52)
MAGNESIUM SERPL-MCNC: 2.1 MG/DL (ref 1.6–2.6)
MCH RBC QN AUTO: 28.7 PG (ref 24–34)
MCHC RBC AUTO-ENTMCNC: 32.8 G/DL (ref 31–37)
MCV RBC AUTO: 87.5 FL (ref 74–97)
MONOCYTES # BLD: 0.7 K/UL (ref 0.05–1.2)
MONOCYTES NFR BLD: 8 % (ref 3–10)
NEUTS SEG # BLD: 5.4 K/UL (ref 1.8–8)
NEUTS SEG NFR BLD: 64 % (ref 40–73)
NITRITE UR QL STRIP.AUTO: NEGATIVE
P-R INTERVAL, ECG05: 138 MS
PH UR STRIP: 5.5 [PH] (ref 5–8)
PLATELET # BLD AUTO: 213 K/UL (ref 135–420)
PMV BLD AUTO: 12 FL (ref 9.2–11.8)
POTASSIUM SERPL-SCNC: 4 MMOL/L (ref 3.5–5.5)
PROT SERPL-MCNC: 9.6 G/DL (ref 6.4–8.2)
PROT UR STRIP-MCNC: ABNORMAL MG/DL
Q-T INTERVAL, ECG07: 402 MS
QRS DURATION, ECG06: 92 MS
QTC CALCULATION (BEZET), ECG08: 502 MS
RBC # BLD AUTO: 4.95 M/UL (ref 4.2–5.3)
RBC #/AREA URNS HPF: 0 /HPF (ref 0–5)
SODIUM SERPL-SCNC: 135 MMOL/L (ref 136–145)
SP GR UR REFRACTOMETRY: 1.02 (ref 1–1.03)
TROPONIN I SERPL-MCNC: <0.02 NG/ML (ref 0–0.04)
UROBILINOGEN UR QL STRIP.AUTO: 1 EU/DL (ref 0.2–1)
VENTRICULAR RATE, ECG03: 94 BPM
WBC # BLD AUTO: 8.4 K/UL (ref 4.6–13.2)
WBC URNS QL MICRO: ABNORMAL /HPF (ref 0–4)

## 2019-12-02 PROCEDURE — 99284 EMERGENCY DEPT VISIT MOD MDM: CPT

## 2019-12-02 PROCEDURE — 80053 COMPREHEN METABOLIC PANEL: CPT

## 2019-12-02 PROCEDURE — 96360 HYDRATION IV INFUSION INIT: CPT

## 2019-12-02 PROCEDURE — 71045 X-RAY EXAM CHEST 1 VIEW: CPT

## 2019-12-02 PROCEDURE — 74011250636 HC RX REV CODE- 250/636: Performed by: EMERGENCY MEDICINE

## 2019-12-02 PROCEDURE — 85025 COMPLETE CBC W/AUTO DIFF WBC: CPT

## 2019-12-02 PROCEDURE — 83735 ASSAY OF MAGNESIUM: CPT

## 2019-12-02 PROCEDURE — 83690 ASSAY OF LIPASE: CPT

## 2019-12-02 PROCEDURE — 81001 URINALYSIS AUTO W/SCOPE: CPT

## 2019-12-02 PROCEDURE — 82550 ASSAY OF CK (CPK): CPT

## 2019-12-02 PROCEDURE — 93005 ELECTROCARDIOGRAM TRACING: CPT

## 2019-12-02 RX ADMIN — SODIUM CHLORIDE 500 ML: 900 INJECTION, SOLUTION INTRAVENOUS at 13:21

## 2019-12-02 NOTE — ED TRIAGE NOTES
Pt was walking around [place] and got nauseous and felt like she was going to [facility]

## 2019-12-02 NOTE — DISCHARGE INSTRUCTIONS
Patient Education        Lightheadedness or Faintness: Care Instructions  Your Care Instructions  Lightheadedness is a feeling that you are about to faint or \"pass out. \" You do not feel as if you or your surroundings are moving. It is different from vertigo, which is the feeling that you or things around you are spinning or tilting. Lightheadedness usually goes away or gets better when you lie down. If lightheadedness gets worse, it can lead to a fainting spell. It is common to feel lightheaded from time to time. Lightheadedness usually is not caused by a serious problem. It often is caused by a short-lasting drop in blood pressure and blood flow to your head that occurs when you get up too quickly from a seated or lying position. Follow-up care is a key part of your treatment and safety. Be sure to make and go to all appointments, and call your doctor if you are having problems. It's also a good idea to know your test results and keep a list of the medicines you take. How can you care for yourself at home? · Lie down for 1 or 2 minutes when you feel lightheaded. After lying down, sit up slowly and remain sitting for 1 to 2 minutes before slowly standing up. · Avoid movements, positions, or activities that have made you lightheaded in the past.  · Get plenty of rest, especially if you have a cold or flu, which can cause lightheadedness. · Make sure you drink plenty of fluids, especially if you have a fever or have been sweating. · Do not drive or put yourself and others in danger while you feel lightheaded. When should you call for help? Call 911 anytime you think you may need emergency care. For example, call if:    · You have symptoms of a stroke. These may include:  ? Sudden numbness, tingling, weakness, or loss of movement in your face, arm, or leg, especially on only one side of your body. ? Sudden vision changes. ? Sudden trouble speaking.   ? Sudden confusion or trouble understanding simple statements. ? Sudden problems with walking or balance. ? A sudden, severe headache that is different from past headaches.     · You have symptoms of a heart attack. These may include:  ? Chest pain or pressure, or a strange feeling in the chest.  ? Sweating. ? Shortness of breath. ? Nausea or vomiting. ? Pain, pressure, or a strange feeling in the back, neck, jaw, or upper belly or in one or both shoulders or arms. ? Lightheadedness or sudden weakness. ? A fast or irregular heartbeat. After you call 911, the  may tell you to chew 1 adult-strength or 2 to 4 low-dose aspirin. Wait for an ambulance. Do not try to drive yourself.    Watch closely for changes in your health, and be sure to contact your doctor if:    · Your lightheadedness gets worse or does not get better with home care. Where can you learn more? Go to http://amariEnmotusnoy.info/. Enter S877 in the search box to learn more about \"Lightheadedness or Faintness: Care Instructions. \"  Current as of: June 26, 2019  Content Version: 12.2  © 3481-4358 [employer]. Care instructions adapted under license by [employer] (which disclaims liability or warranty for this information). If you have questions about a medical condition or this instruction, always ask your healthcare professional. [place] [address] any warranty or liability for your use of this information. Patient Education        Nausea and Vomiting: Care Instructions  Your Care Instructions    When you are nauseated, you may feel weak and sweaty and notice a lot of saliva in your mouth. Nausea often leads to vomiting. Most of the time you do not need to worry about nausea and vomiting, but they can be signs of other illnesses. Two common causes of nausea and vomiting are stomach flu and food poisoning. Nausea and vomiting from viral stomach flu will usually start to improve within 24 hours.  Nausea and vomiting from food poisoning may last from 12 to 48 hours. The doctor has checked you carefully, but problems can develop later. If you notice any problems or new symptoms, get medical treatment right away. Follow-up care is a key part of your treatment and safety. Be sure to make and go to all appointments, and call your doctor if you are having problems. It's also a good idea to know your test results and keep a list of the medicines you take. How can you care for yourself at home? · To prevent dehydration, drink plenty of fluids, enough so that your urine is light yellow or clear like water. Choose water and other caffeine-free clear liquids until you feel better. If you have kidney, heart, or liver disease and have to limit fluids, talk with your doctor before you increase the amount of fluids you drink. · Rest in bed until you feel better. · When you are able to eat, try clear soups, mild foods, and liquids until all symptoms are gone for 12 to 48 hours. Other good choices include dry toast, crackers, cooked cereal, and gelatin dessert, such as Jell-O. When should you call for help? Call 911 anytime you think you may need emergency care. For example, call if:    · You passed out (lost consciousness).    Call your doctor now or seek immediate medical care if:    · You have symptoms of dehydration, such as:  ? Dry eyes and a dry mouth. ? Passing only a little dark urine. ? Feeling thirstier than usual.     · You have new or worsening belly pain.     · You have a new or higher fever.     · You vomit blood or what looks like coffee grounds.    Watch closely for changes in your health, and be sure to contact your doctor if:    · You have ongoing nausea and vomiting.     · Your vomiting is getting worse.     · Your vomiting lasts longer than 2 days.     · You are not getting better as expected. Where can you learn more? Go to http://amari-noy.info/.   Enter 62 [id number] in the search box to learn more about \"Nausea and Vomiting: Care Instructions. \"  Current as of: June 26, 2019  Content Version: 12.2  © 9091-3705 [employer]. Care instructions adapted under license by [employer] (which disclaims liability or warranty for this information). If you have questions about a medical condition or this instruction, always ask your healthcare professional. [address] any warranty or liability for your use of this information.

## 2019-12-02 NOTE — ED PROVIDER NOTES
EMERGENCY DEPARTMENT HISTORY AND PHYSICAL EXAM    12:36 PM      Date: 12/2/2019  Patient Name: Mic Vinson    History of Presenting Illness     Chief Complaint   Patient presents with    Vomiting    Nausea    Shortness of Breath         HISTORY: Mic Vinson is a 69 y.o. female with medical history as listed below who presents with feeling lightheaded it with associated nausea and vomiting earlier today. Patient was at a store with her daughter when she felt lightheaded. Patient ascribes this as feeling as if she was going to fall out. She denies that this was a vertiginous or dizziness in nature. She did have an episode of vomiting with bilious liquid. She says she feels better now. Reports she did not eat this morning. Denies headache, chest pain, difficulty breathing, current abdominal pain. Denies recent illness. She has a history of HIV and reports being compliant with her medications. Reports a low viral load. Last CD4 count in August was 420. PCP: Claire Andres MD    Current Outpatient Medications   Medication Sig Dispense Refill    acetaminophen (TYLENOL EXTRA STRENGTH) 500 mg tablet Take 2 Tabs by mouth three (3) times daily as needed for Pain. 30 Tab 0    hydroCHLOROthiazide (HYDRODIURIL) 25 mg tablet Take 25 mg by mouth daily.  triamcinolone acetonide (KENALOG) 0.1 % ointment Apply 1 Dose to affected area two (2) times a day.  mirtazapine (REMERON) 15 mg tablet Take 15 mg by mouth nightly.  LORazepam (ATIVAN) 1 mg tablet Take 1 mg by mouth nightly.  mirtazapine (REMERON) 15 mg tablet Take 15 mg by mouth nightly.  trifluoperazine (STELAZINE) 5 mg tablet Take 5 mg by mouth nightly.  abacavir-lamiVUDine (EPZICOM) 600-300 mg tablet Take 1 Tab by mouth daily.  OLANZapine (ZYPREXA) 20 mg tablet Take 20 mg by mouth nightly.  lisinopril (PRINIVIL, ZESTRIL) 20 mg tablet Take 20 mg by mouth daily.       aspirin (ASPIRIN) 325 mg tablet Take 1 Tab by mouth daily. 30 Tab 0    amLODIPine (NORVASC) 10 mg tablet Take 1 Tab by mouth daily. 30 Tab 0    atorvastatin (LIPITOR) 40 mg tablet Take 1 Tab by mouth nightly. 30 Tab 0    trimethoprim-sulfamethoxazole (BACTRIM DS) 160-800 mg per tablet Take 1 Tab by mouth two (2) times a day. 8 Tab 0    gabapentin (NEURONTIN) 100 mg capsule Take 1 Cap by mouth three (3) times daily. 30 Cap 0    ritonavir (NORVIR) 100 mg tablet Take 100 mg by mouth two (2) times daily (with meals).  mirtazapine (REMERON) 45 mg tablet Take 45 mg by mouth nightly.  abacavir-lamiVUDine (EPZICOM) 600-300 mg tablet Take 1 Tab by mouth daily.  darunavir (PREZISTA) 600 mg tablet Take 600 mg by mouth two (2) times a day. Past History     Past Medical History:  Past Medical History:   Diagnosis Date    AIDS ([facility] [address]     Asthma     Hypertension        Past Surgical History:  History reviewed. No pertinent surgical history. Family History:  History reviewed. No pertinent family history. Social History:  Social History     Tobacco Use    Smoking status: Never Smoker    Smokeless tobacco: Never Used   Substance Use Topics    Alcohol use: No    Drug use: No       Allergies: Allergies   Allergen Reactions    Levaquin [Levofloxacin] Hives    Pcn [Penicillins] Hives         Review of Systems       Review of Systems   Constitutional: Negative for chills. HENT: Negative for congestion and sore throat. Respiratory: Negative for cough and shortness of breath. Cardiovascular: Negative for chest pain. Gastrointestinal: Positive for nausea and vomiting. Negative for abdominal pain and diarrhea. Genitourinary: Negative for dysuria. Musculoskeletal: Negative for back pain. Skin: Negative for rash. Neurological: Positive for light-headedness. Negative for dizziness and headaches. All other systems reviewed and are negative.         Physical Exam     Visit Vitals  /61   Pulse 96   Temp 97.8 °F (36.6 °C) Resp 23   SpO2 99%     Physical Exam  General Exam: Patient is well developed and well nourished in no distress. Patient does not appear acutely ill or toxic. Eye Exam: Lids and conjunctiva are normal  ENT Exam: The general head and facial exam is normal.  The neck is supple without meningeal signs. No significant adenopathy. Pulmonary Exam: No respiratory distress. The respiratory rate is normal.  No stridor. The breath sounds are equal bilaterally. There are no wheezes, rales, or rhonchi noted. Cardiac Exam: The cardiac rate and rhythm are normal.  No significant murmurs, rubs, or gallops. The peripheral pulses of the upper extremities are normal.  Abdominal Exam: Abdomen is soft and non-distended. No pulsatile masses. There is no local tenderness. There is no rebound or guarding noted. Skin and Soft Tissue: The skin is warm and dry, without significant abnormality. Good color. Musculoskeletal Exam: There is no clubbing or cyanosis. There is no peripheral edema. The musculoskeletal exam of the lower extremities is normal without significant local tenderness or spasm. Neurologic: Pt is alert and appropriate. Normal speech. Normal symmetric muscle strength and tone in all extremities. Normal gait. Psychiatric: Normal adult with appropriate demeanor and interpersonal interaction. Is oriented to person, place, and time. Diagnostic Study Results     Labs -  No results found for this or any previous visit (from the past 12 hour(s)). Radiologic Studies -   XR CHEST PORT   Final Result   IMPRESSION:      No acute radiographic cardiopulmonary abnormality. Medical Decision Making   I am the first provider for this patient. I reviewed the vital signs, available nursing notes, past medical history, past surgical history, family history and social history. Vital Signs-Reviewed the patient's vital signs. EKG: Interpreted by the EP. EKG performed at 1243.    Interpretation rate 94, normal sinus rhythm, no STEMI,  (QTC was 494 and previous EKG obtained December 27, 2018)    Records Reviewed: Nursing Notes (Time of Review: 12:36 PM)    ED Course: Progress Notes, Reevaluation, and Consults:  1:21 PM  Sitting comfortably with her eyes closed. No nausea or vomiting while here. Has a Roe's bag open at the bedside. She was able to eat a little bit. Aware of plan for some IV hydration given elevated CK. Again patient denies any complaints. Plan for likely discharge after IV fluids. Provider Notes (Medical Decision Making): For evaluation after an episode of near syncope. She denies vertigo. She has no symptoms to suggest a stroke. Physical exam is reassuring with no deficits. Exam and history are not consistent with concerns for posterior circulation process. She is overall very well-appearing. Initial vitals are normal.  She does not appear septic. EKG reviewed. Labs without significant findings. Patient's nausea improved without any medications. She was given a small amount of IV hydration due to elevated CK level. She has had no chest pain or shortness of breath. Do not think her symptoms are suggestive of a PE or ACS. Urinalysis is reviewed. Patient has no symptoms of a UTI. Although there are some WBCs and bacteria in her urine it is also a contaminated specimen with epithelial cells. Will not treat as she does not have symptoms. Patient has been able to tolerate p.o. She is ambulatory. She is comfortable with plan for discharge. Diagnosis     Clinical Impression:   1. Near syncope    2. Non-intractable vomiting with nausea, unspecified vomiting type        Disposition: DC    Follow-up Information    None          Patient's Medications   Start Taking    No medications on file   Continue Taking    ABACAVIR-LAMIVUDINE (EPZICOM) 600-300 MG TABLET    Take 1 Tab by mouth daily.     ABACAVIR-LAMIVUDINE (EPZICOM) 600-300 MG TABLET    Take 1 Tab by mouth daily. ACETAMINOPHEN (TYLENOL EXTRA STRENGTH) 500 MG TABLET    Take 2 Tabs by mouth three (3) times daily as needed for Pain. AMLODIPINE (NORVASC) 10 MG TABLET    Take 1 Tab by mouth daily. ASPIRIN (ASPIRIN) 325 MG TABLET    Take 1 Tab by mouth daily. ATORVASTATIN (LIPITOR) 40 MG TABLET    Take 1 Tab by mouth nightly. DARUNAVIR (PREZISTA) 600 MG TABLET    Take 600 mg by mouth two (2) times a day. GABAPENTIN (NEURONTIN) 100 MG CAPSULE    Take 1 Cap by mouth three (3) times daily. HYDROCHLOROTHIAZIDE (HYDRODIURIL) 25 MG TABLET    Take 25 mg by mouth daily. LISINOPRIL (PRINIVIL, ZESTRIL) 20 MG TABLET    Take 20 mg by mouth daily. LORAZEPAM (ATIVAN) 1 MG TABLET    Take 1 mg by mouth nightly. MIRTAZAPINE (REMERON) 15 MG TABLET    Take 15 mg by mouth nightly. MIRTAZAPINE (REMERON) 15 MG TABLET    Take 15 mg by mouth nightly. MIRTAZAPINE (REMERON) 45 MG TABLET    Take 45 mg by mouth nightly. OLANZAPINE (ZYPREXA) 20 MG TABLET    Take 20 mg by mouth nightly. RITONAVIR (NORVIR) 100 MG TABLET    Take 100 mg by mouth two (2) times daily (with meals). TRIAMCINOLONE ACETONIDE (KENALOG) 0.1 % OINTMENT    Apply 1 Dose to affected area two (2) times a day. TRIFLUOPERAZINE (STELAZINE) 5 MG TABLET    Take 5 mg by mouth nightly. TRIMETHOPRIM-SULFAMETHOXAZOLE (BACTRIM DS) 160-800 MG PER TABLET    Take 1 Tab by mouth two (2) times a day. These Medications have changed    No medications on file   Stop Taking    No medications on file     _______________________________    Please note that this dictation was completed with [employer], the computer voice recognition software. Quite often unanticipated grammatical, syntax, homophones, and other interpretive errors are inadvertently transcribed by the computer software. Please disregard these errors. Please excuse any errors that have escaped final proofreading.

## 2020-10-27 ENCOUNTER — APPOINTMENT (OUTPATIENT)
Dept: GENERAL RADIOLOGY | Age: 75
End: 2020-10-27
Attending: EMERGENCY MEDICINE
Payer: MEDICAID

## 2020-10-27 ENCOUNTER — HOSPITAL ENCOUNTER (EMERGENCY)
Age: 75
Discharge: HOME | End: 2020-10-27
Attending: EMERGENCY MEDICINE
Payer: MEDICAID

## 2020-10-27 VITALS
RESPIRATION RATE: 20 BRPM | SYSTOLIC BLOOD PRESSURE: 123 MMHG | BODY MASS INDEX: 22.5 KG/M2 | WEIGHT: 140 LBS | HEART RATE: 92 BPM | DIASTOLIC BLOOD PRESSURE: 69 MMHG | TEMPERATURE: 99 F | HEIGHT: 66 IN | OXYGEN SATURATION: 99 %
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
## 2020-10-27 DIAGNOSIS — S30.0XXA CONTUSION OF LOWER BACK, INITIAL ENCOUNTER: Primary | ICD-10-CM

## 2020-10-27 PROCEDURE — 99283 EMERGENCY DEPT VISIT LOW MDM: CPT

## 2020-10-27 PROCEDURE — 96372 THER/PROPH/DIAG INJ SC/IM: CPT

## 2020-10-27 PROCEDURE — 74011250636 HC RX REV CODE- 250/636: Performed by: EMERGENCY MEDICINE

## 2020-10-27 PROCEDURE — 72110 X-RAY EXAM L-2 SPINE 4/>VWS: CPT

## 2020-10-27 RX ORDER — IBUPROFEN 600 MG/1
600 TABLET ORAL EVERY 8 HOURS
Qty: 15 TAB | Refills: 0 | Status: SHIPPED | OUTPATIENT
Start: 2020-10-27

## 2020-10-27 RX ORDER — KETOROLAC TROMETHAMINE 30 MG/ML
30 INJECTION, SOLUTION INTRAMUSCULAR; INTRAVENOUS
Status: COMPLETED | OUTPATIENT
Start: 2020-10-27 | End: 2020-10-27

## 2020-10-27 RX ADMIN — KETOROLAC TROMETHAMINE 30 MG: 30 INJECTION, SOLUTION INTRAMUSCULAR at 10:12

## 2020-10-27 NOTE — ED TRIAGE NOTES
Pt arrives with EMS after a fall from standing position onto a hard surface. Pt c/o right sided low back pain. Pt denies hitting her head or loc. No blood thinners.

## 2020-10-27 NOTE — ED PROVIDER NOTES
[facility] EMERGENCY DEPT      9:21 AM    Date: 10/27/2020  Patient Name: Jenna Taylor    History of Presenting Illness     Chief Complaint   Patient presents with    Fall    Back Pain       77 y.o. female with noted past medical history who presents to the emergency department who presents to the ER status post fall. Patient states she was at a [employer] store when she went to turn and suddenly fell. She does not believe that she slipped on anything and the floor was not wet. In addition she denies any presyncope lightheadedness or dizziness. With the fall she states she landed on her back and currently has lower back pain she was brought to the ER by EMS transport. She denies any other pain injuries or complaints from the fall. Patient denies any other associated signs or symptoms. Patient denies any other complaints. Nursing notes regarding the HPI and triage nursing notes were reviewed. Prior medical records were reviewed. Current Outpatient Medications   Medication Sig Dispense Refill    acetaminophen (TYLENOL EXTRA STRENGTH) 500 mg tablet Take 2 Tabs by mouth three (3) times daily as needed for Pain. 30 Tab 0    hydroCHLOROthiazide (HYDRODIURIL) 25 mg tablet Take 25 mg by mouth daily.  triamcinolone acetonide (KENALOG) 0.1 % ointment Apply 1 Dose to affected area two (2) times a day.  mirtazapine (REMERON) 15 mg tablet Take 15 mg by mouth nightly.  LORazepam (ATIVAN) 1 mg tablet Take 1 mg by mouth nightly.  mirtazapine (REMERON) 15 mg tablet Take 15 mg by mouth nightly.  trifluoperazine (STELAZINE) 5 mg tablet Take 5 mg by mouth nightly.  abacavir-lamiVUDine (EPZICOM) 600-300 mg tablet Take 1 Tab by mouth daily.  OLANZapine (ZYPREXA) 20 mg tablet Take 20 mg by mouth nightly.  lisinopril (PRINIVIL, ZESTRIL) 20 mg tablet Take 20 mg by mouth daily.  aspirin (ASPIRIN) 325 mg tablet Take 1 Tab by mouth daily.  30 Tab 0    amLODIPine (NORVASC) 10 mg tablet Take 1 Tab by mouth daily. 30 Tab 0    atorvastatin (LIPITOR) 40 mg tablet Take 1 Tab by mouth nightly. 30 Tab 0    trimethoprim-sulfamethoxazole (BACTRIM DS) 160-800 mg per tablet Take 1 Tab by mouth two (2) times a day. 8 Tab 0    gabapentin (NEURONTIN) 100 mg capsule Take 1 Cap by mouth three (3) times daily. 30 Cap 0    ritonavir (NORVIR) 100 mg tablet Take 100 mg by mouth two (2) times daily (with meals).  mirtazapine (REMERON) 45 mg tablet Take 45 mg by mouth nightly.  abacavir-lamiVUDine (EPZICOM) 600-300 mg tablet Take 1 Tab by mouth daily.  darunavir (PREZISTA) 600 mg tablet Take 600 mg by mouth two (2) times a day. Past History     Past Medical History:  Past Medical History:   Diagnosis Date    AIDS ([facility] [address]     Asthma     Hypertension        Past Surgical History:  History reviewed. No pertinent surgical history. Family History:  History reviewed. No pertinent family history. Social History:  Social History     Tobacco Use    Smoking status: Never Smoker    Smokeless tobacco: Never Used   Substance Use Topics    Alcohol use: No    Drug use: No       Allergies: Allergies   Allergen Reactions    Levaquin [Levofloxacin] Hives    Pcn [Penicillins] Hives       Patient's primary care provider (as noted in EPIC): Other, MD Elle    Review of Systems   Constitutional: Negative for diaphoresis. HENT: Negative for congestion. Eyes: Negative for discharge. Respiratory: Negative for stridor. Cardiovascular: Negative for palpitations. Gastrointestinal: Negative for diarrhea. Endocrine: Negative for heat intolerance. Genitourinary: Negative for flank pain. Musculoskeletal: Negative for neck pain. Neurological: Negative for weakness. Psychiatric/Behavioral: Negative for hallucinations. All other systems reviewed and are negative.       Visit Vitals  /69 (BP 1 Location: Right arm, BP Patient Position: Supine)   Pulse 92   Temp 99 °F (37.2 °C)   Resp 20   Ht 5' 6\" (1.676 m)   Wt 63.5 kg (140 lb)   SpO2 99%   BMI 22.60 kg/m²       PHYSICAL EXAM:    CONSTITUTIONAL: Alert, in no apparent distress; well-developed; well-nourished. HEAD:  Normocephalic, atraumatic. No Battles sign. No Raccoons eyes. EYES:  EOM's intact. Normal conjunctiva. Anicteric sclera. ENTM: Nose: no rhinorrhea; Oropharynx:  mucous membranes moist    Neck:  No JVD. No cervical vertebral bony point tenderness or step-off. RESP: Chest clear, equal breath sounds. CARDIOVASCULAR:  Regular rate and rhythm. No murmurs, rubs, or gallops. GI: Normal bowel sounds, abdomen soft and non-tender. No masses or organomegaly. : No costo-vertebral angle tenderness. BACK:  No TLS vertebral bony point tenderness or step-off. UPPER EXT:  Normal inspection  LOWER EXT: No edema, no calf tenderness. Distal pulses intact. NEURO: Grossly normal motor and sensation. SKIN: No rashes; Normal for age and stage. PSYCH:  Alert and oriented, normal affect. Affected area: Bilateral lower lumbar back as well as lower midline vertebrae, lower, have mild reproducible tenderness palpation. No swelling. No abrasions or lacerations. No rash or lesions. DIFFERENTIAL DIAGNOSES/ MEDICAL DECISION MAKING:  Contusion, sprain, dislocation, fracture, ligamentous tear/ disruption or a combination of the above. Diagnostic Study Results     Abnormal lab results from this emergency department encounter:  Labs Reviewed - No data to display    Lab values for this patient within approximately the last 12 hours:  No results found for this or any previous visit (from the past 12 hour(s)). Radiologist and cardiologist interpretations if available at time of this note:  Xr Spine Lumb Min 4 V    Result Date: 10/27/2020  EXAM: XR SPINE LUMB MIN 4 V INDICATION: Lumbar back pain. COMPARISON: MRI lumbar spine May 24, 2018 FINDINGS: 5 separate views of the lumbar spine.  There is mild dextroconvex curvature of the mid to lower lumbar spine demonstrated. Anterolisthesis of L3 on L4 and L4 on L5 is present which have increased in magnitude from comparison MRI. Vertebral body heights are preserved. No evidence of fracture. Mid to lower lumbar predominant spondylosis, greatest L3-S1 with facet joint osteoarthritis spanning these same levels. No discrete pars interarticularis defect. Atherosclerotic vascular calcifications noted throughout the retroperitoneum. IMPRESSION: 1. Mild interval progression of grade 1 sequential anterolisthesis L3 on L4 and L4 and L5. 2. Lower lumbar predominant spondylosis and facet joint osteoarthritis. 3. No acute radiographic abnormality. Medication(s) ordered for patient during this emergency visit encounter:  Medications   ketorolac (TORADOL) injection 30 mg (30 mg IntraMUSCular Given 10/27/20 1012)       Medical Decision Making     I am the first provider for this patient. I reviewed the vital signs, available nursing notes, past medical history, past surgical history, family history and social history. Vital Signs:  Reviewed the patient's vital signs. ED COURSE:      IMPRESSION AND MEDICAL DECISION MAKING:  Based upon the patients presentation with noted HPI and PE, along with the work up done in the emergency department, I believe that the patient is having noted injury from noted fall. DIAGNOSIS:  1. Contusions, lower back  2. Fall    SPECIFIC PATIENT INSTRUCTIONS FROM THE PHYSICIAN WHO TREATED YOU IN THE ER TODAY:  1. Ibuprofen as prescribed until finished. 2. Return if any concerns or worsening condition(s). 3. FOLLOW UP APPOINTMENT:  Your primary doctor in 1-2 days. Patient is improved, resting quietly and comfortably. The patient will be discharged home. The patient was reassured that these symptoms do not appear to represent a serious or life threatening condition at this time.  Warning signs of worsening condition were discussed and understood by the patient. Based on patient's age, coexisting illness, exam, and the results of this ED evaluation, the decision to treat as an outpatient was made. Based on the information available at time of discharge, acute pathology requiring immediate intervention was deemed relative unlikely. While it is impossible to completely exclude the possibility of underlying serious disease or worsening of condition, I feel the relative likelihood is extremely low. I discussed this uncertainty with the patient, who understood ED evaluation and treatment and felt comfortable with the outpatient treatment plan. All questions regarding care, test results, and follow up were answered. The patient is stable and appropriate to discharge. They understand that they should return to the emergency department for any new or worsening symptoms. I stressed the importance of follow up for repeat assessment and possibly further evaluation/treatment. Dictation disclaimer:  Please note that this dictation was completed with [employer], the [employer] voice recognition software. Quite often unanticipated grammatical, syntax, homophones, and other interpretive errors are inadvertently transcribed by the computer software. Please disregard these errors. Please excuse any errors that have escaped final proofreading. Coding Diagnoses     Clinical Impression: No diagnosis found. Disposition     Disposition: Discharge. Cameron Farris M.D. MAYKEL Board Certified Emergency Physician    Provider Attestation:  If a scribe was utilized in generation of this patient record, I personally performed the services described in the documentation, reviewed the documentation, as recorded by the scribe in my presence, and it accurately records the patient's history of presenting illness, review of systems, patient physical examination, and procedures performed by me as the attending physician.      Cameron COULTER Kylah Nunez M.D.   AB Board Certified Emergency Physician  10/27/2020.  9:22 AM

## 2020-10-27 NOTE — DISCHARGE INSTRUCTIONS
SPECIFIC PATIENT INSTRUCTIONS FROM THE PHYSICIAN WHO TREATED YOU IN THE ER TODAY:  1. Ibuprofen as prescribed until finished. 2. Return if any concerns or worsening condition(s). 3. FOLLOW UP APPOINTMENT:  Your primary doctor in 1-2 days. Patient Education        Low Back Contusion: Care Instructions  Your Care Instructions     Contusion is the medical term for a bruise. When you have a low back bruise, it's often caused by a direct blow or an impact, such as falling against a counter or table. Bruises are common sports injuries. Most people think of a bruise as a black-and-blue spot. This happens when small blood vessels get torn and leak blood under the skin. But bones, muscles, and organs can also get bruised. If these deep tissues are damaged, you may not always see a bruise. The doctor will examine your bruise. You may also have tests to make sure you do not have a more serious injury, such as a broken bone or nerve damage. Tests may include X-rays or other imaging tests like a CT scan or MRI. Low back bruises may cause pain and swelling. But if there is no serious damage, they will often get better with home treatment in several days to a few weeks. The doctor has checked you carefully, but problems can develop later. If you notice any problems or new symptoms, get medical treatment right away. Follow-up care is a key part of your treatment and safety. Be sure to make and go to all appointments, and call your doctor if you are having problems. It's also a good idea to know your test results and keep a list of the medicines you take. How can you care for yourself at home? · Put ice or a cold pack on the sore area for 10 to 20 minutes at a time to stop swelling. Put a thin cloth between the ice pack and your skin. · Be safe with medicines. Read and follow all instructions on the label. ? If the doctor gave you a prescription medicine for pain, take it as prescribed.   ? If you are not taking a prescription pain medicine, ask your doctor if you can take an over-the-counter medicine. · For the first day or two of pain, take it easy. But as soon as possible, get back to your normal daily life and activities. · Get gentle exercise, such as walking. Movement keeps your spine flexible and helps your muscles stay strong. When should you call for help? Call 911 anytime you think you may need emergency care. For example, call if:    · You are unable to move a leg at all. Call your doctor now or seek immediate medical care if:    · You have new or worse symptoms in your legs or buttocks. Symptoms may include:  ? Numbness or tingling. ? Weakness. ? Pain.     · You lose bladder or bowel control.     · You have blood in your urine. Watch closely for changes in your health, and be sure to contact your doctor if:    · You do not get better as expected. Where can you learn more? Go to http://www.gray.com/  Enter V337 in the search box to learn more about \"Low Back Contusion: Care Instructions. \"  Current as of: June 26, 2019               Content Version: 12.6  © 4660-2629 [employer]. Care instructions adapted under license by [employer] (which disclaims liability or warranty for this information). If you have questions about a medical condition or this instruction, always ask your healthcare professional. [place] [address] any warranty or liability for your use of this information. Patient Education        Preventing Falls: Care Instructions  Your Care Instructions    Getting around your home safely can be a challenge if you have injuries or health problems that make it easy for you to fall. Loose rugs and furniture in walkways are among the dangers for many older people who have problems walking or who have poor eyesight.  People who have conditions such as arthritis, osteoporosis, or dementia also have to be careful not to fall.  You can make your home safer with a few simple measures. Follow-up care is a key part of your treatment and safety. Be sure to make and go to all appointments, and call your doctor if you are having problems. It's also a good idea to know your test results and keep a list of the medicines you take. How can you care for yourself at home? Taking care of yourself  · You may get dizzy if you do not drink enough water. To prevent dehydration, drink plenty of fluids, enough so that your urine is light yellow or clear like water. Choose water and other caffeine-free clear liquids. If you have kidney, heart, or liver disease and have to limit fluids, talk with your doctor before you increase the amount of fluids you drink. · Exercise regularly to improve your strength, muscle tone, and balance. Walk if you can. Swimming may be a good choice if you cannot walk easily. · Have your vision and hearing checked each year or any time you notice a change. If you have trouble seeing and hearing, you might not be able to avoid objects and could lose your balance. · Know the side effects of the medicines you take. Ask your doctor or pharmacist whether the medicines you take can affect your balance. Sleeping pills or sedatives can affect your balance. · Limit the amount of alcohol you drink. Alcohol can impair your balance and other senses. · Ask your doctor whether calluses or corns on your feet need to be removed. If you wear loose-fitting shoes because of calluses or corns, you can lose your balance and fall. · Talk to your doctor if you have numbness in your feet. Preventing falls at home  · Remove raised doorway thresholds, throw rugs, and clutter. Repair loose carpet or raised areas in the floor. · Move furniture and electrical cords to keep them out of walking paths. · Use nonskid floor wax, and wipe up spills right away, especially on ceramic tile floors. · If you use a walker or cane, put rubber tips on it. If you use crutches, clean the bottoms of them regularly with an abrasive pad, such as steel wool. · Keep your house well lit, especially [place], and outside walkways. Use night-lights in areas such as hallways and bathrooms. Add extra light switches or use remote switches (such as switches that go on or off when you clap your hands) to make it easier to turn lights on if you have to get up during the night. · Install sturdy handrails on stairways. · Move items in your cabinets so that the things you use a lot are on the lower shelves (about waist level). · Keep a cordless phone and a flashlight with new batteries by your bed. If possible, put a phone in each of the main rooms of your house, or carry a cell phone in case you fall and cannot reach a phone. Or, you can wear a device around your neck or wrist. You push a button that sends a signal for help. · Wear low-heeled shoes that fit well and give your feet good support. Use footwear with nonskid soles. Check the heels and soles of your shoes for wear. Repair or replace worn heels or soles. · Do not wear socks without shoes on wood floors. · Walk on the grass when the sidewalks are slippery. If you live in an area that gets snow and ice in the winter, sprinkle salt on slippery steps and sidewalks. Preventing falls in the bath  · Install grab bars and nonskid mats inside and outside your shower or tub and near the toilet and sinks. · Use shower chairs and bath benches. · Use a hand-held shower head that will allow you to sit while showering. · Get into a tub or shower by putting the weaker leg in first. Get out of a tub or shower with your strong side first.  · Repair loose toilet seats and consider installing a raised toilet seat to make getting on and off the toilet easier. · Keep your bathroom door unlocked while you are in the shower. Where can you learn more? Go to http://www.gray.com/.   Enter [id number] [id number] in the search box to learn more about \"Preventing Falls: Care Instructions. \"  Current as of: March 16, 2018  Content Version: 11.8  © 2837-6704 [employer]. Care instructions adapted under license by [employer] (which disclaims liability or warranty for this information). If you have questions about a medical condition or this instruction, always ask your healthcare professional. [address] any warranty or liability for your use of this information. Patient Education        Preventing Outdoor Falls: Care Instructions  Your Care Instructions     Worries about falls don't need to keep you indoors. Outdoor activities like walking have big benefits for your health. You will need to watch your step and learn a few safety measures. If you are worried about having a fall outdoors, ask your doctor about exercises, classes, or physical therapy that may help. You can learn ways to gain strength, flexibility, and balance. Ask if it might help to use a cane or walker. You can make your time outdoors safer with a few simple measures. Follow-up care is a key part of your treatment and safety. Be sure to make and go to all appointments, and call your doctor if you are having problems. It's also a good idea to know your test results and keep a list of the medicines you take. How can you prevent falls outdoors? · Wear shoes with firm soles and low heels. If you have to walk on an icy surface, use grippers that can be worn over your shoes in bad weather. · Be extra careful if weather is bad. Walk on the grass when the sidewalks are slick. If you live in a place that gets snow and ice in the winter, sprinkle salt on slippery stairs and sidewalks. · Be careful getting on or off buses and trains or getting in and out of cars. If handrails are available, use them. · Be careful when you cross the street. Look for crosswalks or places where curb cuts or ramps are present.   · Try not to hurry, especially if you are carrying something. · Be cautious in parking lots or garages. There may be curbs or changes in pavement, or the height of the pavement may vary. · Make sure to wear the correct eyeglasses, if you need them. Reading glasses or bifocals can make it harder to see hazards that might be in your way. · If you are walking outdoors for exercise, try to:  ? Walk in well-lighted, well-maintained areas. These include high school or college tracks, shopping malls, and public spaces. ? Walk with a partner. ? Watch out for cracked sidewalks, curbs, changes in the height of the pavement, exposed tree roots, and debris such as fallen leaves or branches. Where can you learn more? Go to http://www.mauricio.com/  Enter U018 in the search box to learn more about \"Preventing Outdoor Falls: Care Instructions. \"  Current as of: April 15, 2020               Content Version: 12.6  © 1923-7622 [employer]. Care instructions adapted under license by [employer] (which disclaims liability or warranty for this information). If you have questions about a medical condition or this instruction, always ask your healthcare professional. [place] [address] any warranty or liability for your use of this information. Patient Education        13 Ways to Prevent Falls in the Hospital  When you're in the hospital, your risk of falling may be higher than normal. Medicines can make you dizzy. Or illness and treatments may cause you to feel weak and confused, making it harder to move around. But there are ways you can prevent falls during your stay. Things you can do to prevent a fall at the hospital   Bring nonskid socks, slippers, or shoes that stay on your feet. If you do not have these, ask the nurse for a pair of nonskid socks. Bring your walker or cane, if you use one at home. Or ask the hospital to provide one during your stay.     Ask your doctor or nurse if your treatments or medicines will increase your risk of a fall. Some hospitals will check your risk when you are admitted to the hospital.    Tell your doctor or nurse if medicines make you dizzy, weak, or lightheaded. If you feel this way, do not try to get up on your own. Call the nurse for help. Call the nurse for help getting out of bed if you are on crutches or have trouble walking. Do not try to do it on your own until the nurse says it's ok and you feel sure you are able. When your nurse says it's ok, get up slowly. Sit up first and count to 10 before you get out of bed. Put on your eyeglasses before you get out of bed, if you wear them. If you need help getting to the bathroom, call the nurse before you have an urgent need to go. If you get fluids through a vein (IV), you may need to go to the bathroom more often. Things the hospital staff can do to prevent a fall   Keep needed items close by. Your phone, the nurse call light or button, and anything you need to help you walk should be close to you. Keep your bed low and locked. Your bed should be low enough so that you don't have problems getting out of bed. The wheels on your bed should be locked so the bed can't move. Explain what is safe. Your nurse or doctor will tell you what you can safely do and how often you need to get up and move around. Keep your room neat. Your room should not have any wet or slippery areas. There should be nothing in the way of going to the bathroom or hallway. Light up the room. Your room should have good lighting. Make sure there is a night light in your bathroom. Current as of: May 27, 2020               Content Version: 12.6  © 2006-2020 [employer]. Care instructions adapted under license by [employer] (which disclaims liability or warranty for this information).  If you have questions about a medical condition or this instruction, always ask your healthcare professional. [place] [address] any warranty or liability for your use of this information. Patient Education        How to Get Up Safely After a Fall: Care Instructions  Your Care Instructions     If you have injuries, health problems, or other reasons that may make it easy for you to fall at home, it is a good idea to learn how to get up safely after a fall. Learning how to get up correctly can help you avoid making an injury worse. Also, knowing what to do if you cannot get up can help you stay safe until help arrives. Follow-up care is a key part of your treatment and safety. Be sure to make and go to all appointments, and call your doctor if you are having problems. It's also a good idea to know your test results and keep a list of the medicines you take. How can you care for yourself after a fall? If you think you can get up  First lie still for a few minutes and think about how you feel. If your body feels okay and you think you can get up safely, follow the rest of the steps below:  1. Look for a chair or other piece of furniture that is close to you. 2. Roll onto your side and rest. Roll by turning your head in the direction you want to roll, move your shoulder and arm, then hip and leg in the same direction. 3. Lie still for a moment to let your blood pressure adjust.  4. Slowly push your upper body up, lift your head, and take a moment to rest.  5. Slowly get up on your hands and knees, and crawl to the chair or other stable piece of furniture. 6. Put your hands on the chair. 7. Move one foot forward, and place it flat on the floor. Your other leg should be bent with the knee on the floor. 8. Rise slowly, turn your body, and sit in the chair. Stay seated for a bit and think about how you feel. Call for help. Even if you feel okay, let someone know what happened to you. You might not know that you have a serious injury. If you cannot get up  1.  If you think you are injured after a fall or you cannot get up, try not to panic. 2. Call out for help. 3. If you have a phone within reach or you have an emergency call device, use it to call for help. 4. If you do not have a phone within reach, try to slide yourself toward it. If you cannot get to the phone, try to slide toward a door or window or a place where you think you can be heard. 5. [place] or use an object to make noise so someone might hear you. 6. If you can reach something that you can use for a pillow, place it under your head. Try to stay warm by covering yourself with a blanket or clothing while you wait for help. When should you call for help? Call 911 anytime you think you may need emergency care. For example, call if:    · You passed out (lost consciousness).     · You cannot get up after a fall.     · You have severe pain. Call your doctor now or seek immediate medical care if:    · You have new or worse pain.     · You are dizzy or lightheaded.     · You hit your head. Watch closely for changes in your health, and be sure to contact your doctor if:    · You do not get better as expected. Where can you learn more? Go to http://www.PlayCanvas.com/  Enter G513 in the search box to learn more about \"How to Get Up Safely After a Fall: Care Instructions. \"  Current as of: April 15, 2020               Content Version: 12.6  © 7232-5575 [employer]. Care instructions adapted under license by [employer] (which disclaims liability or warranty for this information). If you have questions about a medical condition or this instruction, always ask your healthcare professional. [place] [address] any warranty or liability for your use of this information. MyChart Activation    Thank you for requesting access to [facility]. Please follow the instructions below to securely access and download your online medical record.  [facility] allows you to send messages to your doctor, view your test results, renew your prescriptions, schedule appointments, and more. How Do I Sign Up? In your internet browser, go to [email or web address]. Click on the First Time User? Click Here link in the Sign In box. You will see the New Member Sign Up page. Enter your Check I'm Here Access Code exactly as it appears below. You will not need to use this code after you´ve completed the sign-up process. If you do not sign up before the expiration date, you must request a new code. Check I'm Here Access Code: [email or web address]  Expires: 3/28/2019  2:27 PM (This is the date your Check I'm Here access code will )    Enter the last four digits of your Social Security Number (xxxx) and Date of Birth (mm/dd/yyyy) as indicated and click Submit. You will be taken to the next sign-up page. Create a Check I'm Here ID. This will be your Check I'm Here login ID and cannot be changed, so think of one that is secure and easy to remember. Create a Check I'm Here password. You can change your password at any time. Enter your Password Reset Question and Answer. This can be used at a later time if you forget your password. Enter your e-mail address. You will receive e-mail notification when new information is available in [address] Click Sign Up. You can now view and download portions of your medical record. Click the [place] link to download a portable copy of your medical information. Additional Information    If you have questions, please visit the Frequently Asked Questions section of the Check I'm Here website at [email or web address] Remember, Check I'm Here is NOT to be used for urgent needs. For medical emergencies, dial 911.

## 2020-10-27 NOTE — ED NOTES
Pt seen evaluated and treated by provider. Discharge instructions given with understanding verbalized.